# Patient Record
Sex: FEMALE | NOT HISPANIC OR LATINO | URBAN - METROPOLITAN AREA
[De-identification: names, ages, dates, MRNs, and addresses within clinical notes are randomized per-mention and may not be internally consistent; named-entity substitution may affect disease eponyms.]

---

## 2021-03-01 ENCOUNTER — APPOINTMENT (RX ONLY)
Dept: URBAN - METROPOLITAN AREA CLINIC 31 | Facility: CLINIC | Age: 66
Setting detail: DERMATOLOGY
End: 2021-03-01

## 2021-03-01 DIAGNOSIS — L29.89 OTHER PRURITUS: ICD-10-CM

## 2021-03-01 DIAGNOSIS — D22 MELANOCYTIC NEVI: ICD-10-CM

## 2021-03-01 DIAGNOSIS — L81.4 OTHER MELANIN HYPERPIGMENTATION: ICD-10-CM

## 2021-03-01 DIAGNOSIS — L29.8 OTHER PRURITUS: ICD-10-CM

## 2021-03-01 DIAGNOSIS — L60.3 NAIL DYSTROPHY: ICD-10-CM

## 2021-03-01 DIAGNOSIS — L82.1 OTHER SEBORRHEIC KERATOSIS: ICD-10-CM

## 2021-03-01 DIAGNOSIS — L28.1 PRURIGO NODULARIS: ICD-10-CM

## 2021-03-01 PROBLEM — D22.5 MELANOCYTIC NEVI OF TRUNK: Status: ACTIVE | Noted: 2021-03-01

## 2021-03-01 PROCEDURE — ? PHOTO-DOCUMENTATION

## 2021-03-01 PROCEDURE — ? PRESCRIPTION

## 2021-03-01 PROCEDURE — ? DIAGNOSIS COMMENT

## 2021-03-01 PROCEDURE — 99203 OFFICE O/P NEW LOW 30 MIN: CPT

## 2021-03-01 PROCEDURE — ? COUNSELING

## 2021-03-01 PROCEDURE — ? ADDITIONAL NOTES

## 2021-03-01 RX ORDER — BETAMETHASONE DIPROPIONATE 0.5 MG/G
1 CREAM TOPICAL BID
Qty: 1 | Refills: 2 | Status: ERX | COMMUNITY
Start: 2021-03-01

## 2021-03-01 RX ADMIN — BETAMETHASONE DIPROPIONATE 1: 0.5 CREAM TOPICAL at 00:00

## 2021-03-01 ASSESSMENT — LOCATION SIMPLE DESCRIPTION DERM
LOCATION SIMPLE: RIGHT HAND
LOCATION SIMPLE: LEFT HAND
LOCATION SIMPLE: RIGHT UPPER BACK
LOCATION SIMPLE: LEFT FOREARM
LOCATION SIMPLE: RIGHT BUTTOCK
LOCATION SIMPLE: LEFT BUTTOCK
LOCATION SIMPLE: RIGHT FOREARM
LOCATION SIMPLE: RIGHT CALF

## 2021-03-01 ASSESSMENT — LOCATION ZONE DERM
LOCATION ZONE: HAND
LOCATION ZONE: TRUNK
LOCATION ZONE: LEG
LOCATION ZONE: ARM

## 2021-03-01 ASSESSMENT — LOCATION DETAILED DESCRIPTION DERM
LOCATION DETAILED: RIGHT DISTAL CALF
LOCATION DETAILED: LEFT BUTTOCK
LOCATION DETAILED: RIGHT BUTTOCK
LOCATION DETAILED: LEFT ULNAR DORSAL HAND
LOCATION DETAILED: LEFT PROXIMAL DORSAL FOREARM
LOCATION DETAILED: RIGHT MID-UPPER BACK
LOCATION DETAILED: RIGHT PROXIMAL RADIAL DORSAL FOREARM
LOCATION DETAILED: RIGHT ULNAR DORSAL HAND
LOCATION DETAILED: RIGHT RADIAL DORSAL HAND

## 2021-03-01 NOTE — PROCEDURE: DIAGNOSIS COMMENT
Comment: See photos and body map, 03/01/2021
Detail Level: Detailed
Render Risk Assessment In Note?: no

## 2021-03-01 NOTE — PROCEDURE: COUNSELING
Detail Level: Simple
Detail Level: Generalized
Detail Level: Detailed
Patient Specific Counseling (Will Not Stick From Patient To Patient): Pt states present for 1 year, but had it also 20 years ago.  Discussed ddx of SCC.  F/u if does not respond to tx.  Declines ln2 today.
Detail Level: Zone

## 2021-03-01 NOTE — PROCEDURE: ADDITIONAL NOTES
Additional Notes: Discussed nail dystrophy occurs as we age. Encouraged to moisturize hands several times daily.
Detail Level: Detailed
Render Risk Assessment In Note?: no
Additional Notes: Patient may use betamethasone cream bid to affected areas that is being prescribed for purigo nodule.  Recommend one week daily use.  Also recommended OTC nonsedating antihistamines.

## 2021-08-10 PROBLEM — Z96.659 TOTAL KNEE REPLACEMENT STATUS: Status: ACTIVE | Noted: 2021-08-10

## 2021-10-13 ENCOUNTER — HOSPITAL ENCOUNTER (OUTPATIENT)
Facility: MEDICAL CENTER | Age: 66
End: 2021-10-13
Attending: ANESTHESIOLOGY
Payer: MEDICARE

## 2021-10-13 LAB
COVID ORDER STATUS COVID19: NORMAL
SARS-COV-2 RNA RESP QL NAA+PROBE: NOTDETECTED
SPECIMEN SOURCE: NORMAL

## 2021-10-13 PROCEDURE — U0003 INFECTIOUS AGENT DETECTION BY NUCLEIC ACID (DNA OR RNA); SEVERE ACUTE RESPIRATORY SYNDROME CORONAVIRUS 2 (SARS-COV-2) (CORONAVIRUS DISEASE [COVID-19]), AMPLIFIED PROBE TECHNIQUE, MAKING USE OF HIGH THROUGHPUT TECHNOLOGIES AS DESCRIBED BY CMS-2020-01-R: HCPCS

## 2021-10-13 PROCEDURE — U0005 INFEC AGEN DETEC AMPLI PROBE: HCPCS

## 2022-02-28 ENCOUNTER — APPOINTMENT (RX ONLY)
Dept: URBAN - METROPOLITAN AREA CLINIC 31 | Facility: CLINIC | Age: 67
Setting detail: DERMATOLOGY
End: 2022-02-28

## 2022-02-28 DIAGNOSIS — L81.4 OTHER MELANIN HYPERPIGMENTATION: ICD-10-CM

## 2022-02-28 DIAGNOSIS — L20.89 OTHER ATOPIC DERMATITIS: ICD-10-CM

## 2022-02-28 DIAGNOSIS — L82.1 OTHER SEBORRHEIC KERATOSIS: ICD-10-CM

## 2022-02-28 DIAGNOSIS — D22 MELANOCYTIC NEVI: ICD-10-CM | Status: STABLE

## 2022-02-28 PROBLEM — L20.84 INTRINSIC (ALLERGIC) ECZEMA: Status: ACTIVE | Noted: 2022-02-28

## 2022-02-28 PROBLEM — D22.5 MELANOCYTIC NEVI OF TRUNK: Status: ACTIVE | Noted: 2022-02-28

## 2022-02-28 PROCEDURE — ? COUNSELING

## 2022-02-28 PROCEDURE — ? DIAGNOSIS COMMENT

## 2022-02-28 PROCEDURE — 99213 OFFICE O/P EST LOW 20 MIN: CPT

## 2022-02-28 PROCEDURE — ? OBSERVATION AND MEASURE

## 2022-02-28 ASSESSMENT — LOCATION SIMPLE DESCRIPTION DERM
LOCATION SIMPLE: ABDOMEN
LOCATION SIMPLE: CHEST
LOCATION SIMPLE: RIGHT HAND
LOCATION SIMPLE: RIGHT FOREARM
LOCATION SIMPLE: LEFT BUTTOCK
LOCATION SIMPLE: RIGHT UPPER BACK
LOCATION SIMPLE: GROIN
LOCATION SIMPLE: RIGHT BUTTOCK
LOCATION SIMPLE: RIGHT BREAST

## 2022-02-28 ASSESSMENT — LOCATION DETAILED DESCRIPTION DERM
LOCATION DETAILED: RIGHT MID-UPPER BACK
LOCATION DETAILED: RIGHT VENTRAL PROXIMAL FOREARM
LOCATION DETAILED: RIGHT BUTTOCK
LOCATION DETAILED: RIGHT SUPRAPUBIC SKIN
LOCATION DETAILED: RIGHT INFRAMAMMARY CREASE (OUTER QUADRANT)
LOCATION DETAILED: RIGHT RADIAL DORSAL HAND
LOCATION DETAILED: LEFT BUTTOCK
LOCATION DETAILED: LEFT RIB CAGE
LOCATION DETAILED: LEFT MEDIAL SUPERIOR CHEST

## 2022-02-28 ASSESSMENT — LOCATION ZONE DERM
LOCATION ZONE: HAND
LOCATION ZONE: ARM
LOCATION ZONE: TRUNK

## 2022-02-28 NOTE — PROCEDURE: OBSERVATION
Detail Level: Detailed
Size Of Lesion: 4mm
Morphology Per Location (Optional): Dark macule
Morphology Per Location (Optional): Brown macule
Size Of Lesion: 5mm
Morphology Per Location (Optional): Slightly irregular macule

## 2022-11-28 ENCOUNTER — RX ONLY (OUTPATIENT)
Age: 67
Setting detail: RX ONLY
End: 2022-11-28

## 2022-11-28 RX ORDER — BETAMETHASONE DIPROPIONATE 0.5 MG/G
CREAM TOPICAL
Qty: 45 | Refills: 0 | Status: ERX | COMMUNITY
Start: 2022-11-28

## 2022-12-19 ENCOUNTER — OFFICE VISIT (OUTPATIENT)
Dept: URGENT CARE | Facility: CLINIC | Age: 67
End: 2022-12-19
Payer: MEDICARE

## 2022-12-19 VITALS
TEMPERATURE: 98.4 F | SYSTOLIC BLOOD PRESSURE: 130 MMHG | DIASTOLIC BLOOD PRESSURE: 68 MMHG | HEIGHT: 67 IN | HEART RATE: 90 BPM | RESPIRATION RATE: 12 BRPM | OXYGEN SATURATION: 95 % | WEIGHT: 181 LBS | BODY MASS INDEX: 28.41 KG/M2

## 2022-12-19 DIAGNOSIS — L03.011 CELLULITIS OF RIGHT INDEX FINGER: ICD-10-CM

## 2022-12-19 DIAGNOSIS — L08.9 INFECTED DOG BITE OF RIGHT INDEX FINGER, INITIAL ENCOUNTER: ICD-10-CM
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
## 2022-12-19 DIAGNOSIS — S61.250A INFECTED DOG BITE OF RIGHT INDEX FINGER, INITIAL ENCOUNTER: ICD-10-CM

## 2022-12-19 PROCEDURE — 99203 OFFICE O/P NEW LOW 30 MIN: CPT | Mod: 25 | Performed by: PHYSICIAN ASSISTANT

## 2022-12-19 PROCEDURE — 90715 TDAP VACCINE 7 YRS/> IM: CPT | Performed by: PHYSICIAN ASSISTANT

## 2022-12-19 PROCEDURE — 90471 IMMUNIZATION ADMIN: CPT | Performed by: PHYSICIAN ASSISTANT

## 2022-12-19 RX ORDER — AMOXICILLIN AND CLAVULANATE POTASSIUM 875; 125 MG/1; MG/1
1 TABLET, FILM COATED ORAL 2 TIMES DAILY
Qty: 14 TABLET | Refills: 0 | Status: SHIPPED | OUTPATIENT
Start: 2022-12-19 | End: 2022-12-26

## 2022-12-19 RX ORDER — BETAMETHASONE DIPROPIONATE 0.5 MG/G
CREAM TOPICAL
COMMUNITY
Start: 2022-11-28 | End: 2024-02-26 | Stop reason: SDUPTHER

## 2022-12-19 RX ORDER — FENOFIBRATE 200 MG/1
200 CAPSULE ORAL
COMMUNITY
Start: 2022-11-22

## 2022-12-19 ASSESSMENT — ENCOUNTER SYMPTOMS
TINGLING: 0
FEVER: 0
VOMITING: 0
SENSORY CHANGE: 0
NAUSEA: 0
CHILLS: 0

## 2022-12-19 NOTE — PROGRESS NOTES
Subjective     Sabina Rivas is a 67 y.o. female who presents with Dog Bite ((R) index finger)    HPI:  Cheryl Rivas is a 67 y.o. female who presents today for evaluation of a dog bite to her right index finger.  2 days ago patient tried to take food away from her dog and the dog nipped at her finger.  It bit the tip of her right index finger and jovanni blood.  She says that it was bleeding a lot the initial day.  Yesterday it started to get a bit more swollen and was draining some clear fluid.  Today swelling and redness is noted throughout the finger.  She is not had any fever/chills or numbness/tingling.  No decreased range of motion.      Review of Systems   Constitutional:  Negative for chills and fever.   Gastrointestinal:  Negative for nausea and vomiting.   Skin:         Dog bite of right index finger   Neurological:  Negative for tingling and sensory change.           PMH:  has a past medical history of Asthma.  MEDS:   Current Outpatient Medications:     citalopram (CELEXA) 10 MG tablet, , Disp: , Rfl:     albuterol 108 (90 Base) MCG/ACT Aero Soln inhalation aerosol, , Disp: , Rfl:     fluticasone (FLONASE) 50 MCG/ACT nasal spray, Spray 1 Spray in nose 2 times a day., Disp: , Rfl:     progesterone (PROMETRIUM) 100 MG Cap, Take 100 mg by mouth every day., Disp: , Rfl:     levothyroxine (SYNTHROID) 50 MCG Tab, Take 50 mcg by mouth Every morning on an empty stomach., Disp: , Rfl:     fenofibrate (TRICOR) 145 MG Tab, Take 145 mg by mouth every day., Disp: , Rfl:     Omega-3 Fatty Acids (OMEGA-3 FISH OIL) 1200 MG Cap, Take 2 tablet by mouth 2 Times a Day., Disp: , Rfl:     betamethasone dipropionate 0.05 % Cream, APPLY TO AFFECTED AREAS TWICE DAILY AS NEEDED FOR RASH/ITCH. AVOID FACE, GROIN, UNDERARMS., Disp: , Rfl:     fenofibrate micronized (LOFIBRA) 200 MG capsule, Take 200 mg by mouth every day., Disp: , Rfl:   ALLERGIES:   Allergies   Allergen Reactions    Oxycodone Rash and Vomiting     Rash all over/vomiting   "    SURGHX:   Past Surgical History:   Procedure Laterality Date    PB TOTAL KNEE ARTHROPLASTY Right 10/18/2021    Procedure: RIGHT TOTAL KNEE ARTHROPLASTY;  Surgeon: Joel Salinas M.D.;  Location: [facility];  Service: Orthopedics    KNEE ARTHROSCOPY Right 1/3/2020    Procedure: RT KNEE ARTHROSCOPY POSSIBLE ARTHROTOMY PARTIAL MEDIAL MENISCECTMY AND CHONDROPLASTY;  Surgeon: Marques Bhakta M.D.;  Location: SURGERY [facility];  Service: Orthopedics    OTHER      face lift    OTHER ABDOMINAL SURGERY      appendectomy     SOCHX:  reports that she has never smoked. She has never used smokeless tobacco. She reports current alcohol use of about 4.2 oz per week. She reports that she does not use drugs.  FH: Family history was reviewed, no pertinent findings to report      Objective     /68 (BP Location: Left arm, Patient Position: Sitting, BP Cuff Size: Adult)   Pulse 90   Temp 36.9 °C (98.4 °F) (Temporal)   Resp 12   Ht 1.702 m (5' 7\")   Wt 82.1 kg (181 lb)   SpO2 95%   BMI 28.35 kg/m²      Physical Exam  Constitutional:       General: She is not in acute distress.     Appearance: She is not diaphoretic.   HENT:      Head: Normocephalic and atraumatic.      Right Ear: External ear normal.      Left Ear: External ear normal.   Eyes:      Conjunctiva/sclera: Conjunctivae normal.      Pupils: Pupils are equal, round, and reactive to light.   Pulmonary:      Effort: Pulmonary effort is normal. No respiratory distress.   Musculoskeletal:      Cervical back: Normal range of motion.      Comments: Right hand: Pad of right second digit exhibits a scabbed over puncture wound/laceration approximately 0.25 cm in diameter.  There is no current discharge.  Entire second digit exhibits overlying erythema and mild soft tissue swelling.  Tenderness noted around the DIP joint and distal pad.  Full ROM with flexion and extension.  Distal neurovascular status intact.  Cap refill less than 2 " seconds.   Skin:     Findings: No rash.   Neurological:      Mental Status: She is alert and oriented to person, place, and time.   Psychiatric:         Mood and Affect: Mood and affect normal.         Cognition and Memory: Memory normal.         Judgment: Judgment normal.           Assessment & Plan     1. Infected dog bite of right index finger, initial encounter  - Tdap Vaccine =>6YO IM  - amoxicillin-clavulanate (AUGMENTIN) 875-125 MG Tab; Take 1 Tablet by mouth 2 times a day for 7 days.  Dispense: 14 Tablet; Refill: 0    2. Cellulitis of right index finger  - amoxicillin-clavulanate (AUGMENTIN) 875-125 MG Tab; Take 1 Tablet by mouth 2 times a day for 7 days.  Dispense: 14 Tablet; Refill: 0      Patient with infected dog bite.  We will start her on Augmentin.  Tdap also updated.  She may use OTC analgesics as needed for pain.  Should keep the wound open to the air unless it is draining or she is doing any dirty activities and she should keep it covered with a loosefitting dressing.  If patient has worsening redness or swelling or she develops fever I would recommend that she go to the emergency department for higher level of care.            Differential Diagnosis, natural history, and supportive care discussed. Return to the Urgent Care or follow up with your PCP if symptoms fail to resolve, or for any new or worsening symptoms. Emergency room precautions discussed. Patient and/or family appears understanding of information.

## 2023-01-27 ENCOUNTER — OFFICE VISIT (OUTPATIENT)
Dept: URGENT CARE | Facility: CLINIC | Age: 68
End: 2023-01-27
Payer: MEDICARE

## 2023-01-27 VITALS
OXYGEN SATURATION: 96 % | WEIGHT: 181 LBS | HEIGHT: 67 IN | DIASTOLIC BLOOD PRESSURE: 84 MMHG | RESPIRATION RATE: 14 BRPM | TEMPERATURE: 97.7 F | SYSTOLIC BLOOD PRESSURE: 134 MMHG | HEART RATE: 82 BPM | BODY MASS INDEX: 28.41 KG/M2

## 2023-01-27 DIAGNOSIS — S61.451A DOG BITE OF RIGHT HAND, INITIAL ENCOUNTER: ICD-10-CM
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
## 2023-01-27 PROCEDURE — 99213 OFFICE O/P EST LOW 20 MIN: CPT | Performed by: PHYSICIAN ASSISTANT

## 2023-01-27 RX ORDER — AMOXICILLIN AND CLAVULANATE POTASSIUM 875; 125 MG/1; MG/1
1 TABLET, FILM COATED ORAL 2 TIMES DAILY
Qty: 14 TABLET | Refills: 0 | Status: SHIPPED | OUTPATIENT
Start: 2023-01-27 | End: 2023-02-03

## 2023-01-27 ASSESSMENT — ENCOUNTER SYMPTOMS
FEVER: 0
JOINT SWELLING: 1

## 2023-01-27 NOTE — PROGRESS NOTES
Gilbert Rivas is a 67 y.o. female who presents with Animal Bite (Dog bite on R hand, swelling x 1 hour ago )            This is a new problem.  The patient presents to clinic secondary to a dog bite to the right hand.  The patient states the injury occurred approximately 1 hour prior to arrival.  The patient states her dog got into a fight with another dog.  The patient states that she was attempting to break up the dog bite her own dog bit her right hand.  The patient states she sustained 2 puncture wounds to the back of her right hand.  The patient reports associated pain, swelling, and bruising surrounding the injured area.  The patient notes some active bleeding from the puncture wounds.  She reports no discharge/drainage.  She also reports no associated redness or increased warmth.  The patient denies decreased range of motion of the right hand.  She also denies numbness, tingling, or weakness.  The patient has not taken any OTC medications for her current symptoms.  The patient states her tetanus vaccine is up-to-date.  The patient states her dog is also up-to-date on his vaccines.    Animal Bite  Associated symptoms include joint swelling. Pertinent negatives include no fever.     PMH:  has a past medical history of Asthma.  MEDS:   Current Outpatient Medications:     fenofibrate micronized (LOFIBRA) 200 MG capsule, Take 200 mg by mouth every day., Disp: , Rfl:     citalopram (CELEXA) 10 MG tablet, , Disp: , Rfl:     albuterol 108 (90 Base) MCG/ACT Aero Soln inhalation aerosol, , Disp: , Rfl:     fluticasone (FLONASE) 50 MCG/ACT nasal spray, Spray 1 Spray in nose 2 times a day., Disp: , Rfl:     progesterone (PROMETRIUM) 100 MG Cap, Take 100 mg by mouth every day., Disp: , Rfl:     levothyroxine (SYNTHROID) 50 MCG Tab, Take 50 mcg by mouth Every morning on an empty stomach., Disp: , Rfl:     fenofibrate (TRICOR) 145 MG Tab, Take 145 mg by mouth every day., Disp: , Rfl:     Omega-3 Fatty Acids  "(OMEGA-3 FISH OIL) 1200 MG Cap, Take 2 tablet by mouth 2 Times a Day., Disp: , Rfl:     betamethasone dipropionate 0.05 % Cream, APPLY TO AFFECTED AREAS TWICE DAILY AS NEEDED FOR RASH/ITCH. AVOID FACE, GROIN, UNDERARMS. (Patient not taking: Reported on 1/27/2023), Disp: , Rfl:   ALLERGIES:   Allergies   Allergen Reactions    Oxycodone Rash and Vomiting     Rash all over/vomiting      SURGHX:   Past Surgical History:   Procedure Laterality Date    PB TOTAL KNEE ARTHROPLASTY Right 10/18/2021    Procedure: RIGHT TOTAL KNEE ARTHROPLASTY;  Surgeon: Joel Salinas M.D.;  Location: [facility] [place];  Service: Orthopedics    KNEE ARTHROSCOPY Right 1/3/2020    Procedure: RT KNEE ARTHROSCOPY POSSIBLE ARTHROTOMY PARTIAL MEDIAL MENISCECTMY AND CHONDROPLASTY;  Surgeon: Marques Bhakta M.D.;  Location: [facility];  Service: Orthopedics    OTHER      face lift    OTHER ABDOMINAL SURGERY      appendectomy     SOCHX:  reports that she has never smoked. She has never used smokeless tobacco. She reports current alcohol use of about 4.2 oz per week. She reports that she does not use drugs.  FH: Family history was reviewed, no pertinent findings to report      Review of Systems   Constitutional:  Negative for fever.   Musculoskeletal:  Positive for joint swelling. Negative for joint pain.   Skin:         + dog bite to right hand   All other systems reviewed and are negative.           Objective     /84 (BP Location: Right arm, Patient Position: Sitting, BP Cuff Size: Adult)   Pulse 82   Temp 36.5 °C (97.7 °F) (Temporal)   Resp 14   Ht 1.702 m (5' 7\")   Wt 82.1 kg (181 lb)   SpO2 96%   BMI 28.35 kg/m²      Physical Exam  Constitutional:       General: She is not in acute distress.     Appearance: Normal appearance. She is well-developed. She is not ill-appearing.   HENT:      Head: Normocephalic and atraumatic.      Right Ear: External ear normal.      Left Ear: External ear normal.   Eyes: "      Extraocular Movements: Extraocular movements intact.      Conjunctiva/sclera: Conjunctivae normal.   Cardiovascular:      Rate and Rhythm: Normal rate.   Pulmonary:      Effort: Pulmonary effort is normal.   Musculoskeletal:      Cervical back: Normal range of motion and neck supple.      Comments:   Right Hand:  2 puncture wounds are present to the dorsal aspect of the right hand overlying the second and third metacarpals with surrounding tenderness palpation, localized edema, and ecchymosis.  No surrounding erythema.  No increased warmth.  No active bleeding.  No active discharge/drainage.  No secondary signs of infection.  ROM intact -the patient demonstrates full active range of motion of the right hand  Neurovascular intact distally  Strength 5/5 -flexion/extension of the digits of the right hand against resistance   strength 5/5   Skin:     General: Skin is warm and dry.   Neurological:      Mental Status: She is alert and oriented to person, place, and time.                Progress:   Wound Care:  Cleansed the patient's wound with Hibiclens.  Irrigated the wound with saline.  Applied a nonstick to the wound with Coban.  Educated the patient on proper wound care.  Advised the patient to monitor for signs of infection.             Assessment & Plan            1. Dog bite of right hand, initial encounter  - amoxicillin-clavulanate (AUGMENTIN) 875-125 MG Tab; Take 1 Tablet by mouth 2 times a day for 7 days.  Dispense: 14 Tablet; Refill: 0    The patient's presenting symptoms and physical exam endings are consistent with a dog bite of the right hand.  We will prophylactically treat the patient with Augmentin at this time.  Advised the patient to monitor for worsening signs and or symptoms.  Recommend OTC medications and supportive care for symptomatic management.  Recommend patient follow-up with her PCP as needed.  Discussed return precautions with the patient, and she verbalized  understanding.    Differential diagnoses, supportive care, and indications for immediate follow-up discussed with patient.   Instructed to return to clinic or nearest emergency department for any change in condition, further concerns, or worsening of symptoms.    OTC Tylenol or Motrin for fever/discomfort.  Keep wounds clean and dry  Cover wounds as needed  Allow wounds to be open to the air for at least a portion of the day  Avoid soaking the wounds  Monitor for signs of infection  Follow-up with PCP  Return to clinic or go to the ED if symptoms worsen or fail to improve, or if patient should develop worsening/increasing/persistent pain/tenderness to the injured area, swelling, bruising, redness or warmth to the injured area, discharge/drainage from the wound, decreased range of motion, fever/chills, secondary signs of infection, and/or any concerning symptoms.    Discussed plan with the patient, and she agrees to the above.     I personally reviewed prior external notes and test results pertinent to today's visit.  I have independently reviewed and interpreted all diagnostics ordered during this urgent care visit.     Please note that this dictation was created using voice recognition software. I have made every reasonable attempt to correct obvious errors, but I expect that there may be errors of grammar and possibly content that I did not discover before finalizing the note.     This note was electronically signed by Lorena Vazquez PA-C

## 2023-02-24 SDOH — ECONOMIC STABILITY: TRANSPORTATION INSECURITY
IN THE PAST 12 MONTHS, HAS LACK OF TRANSPORTATION KEPT YOU FROM MEETINGS, WORK, OR FROM GETTING THINGS NEEDED FOR DAILY LIVING?: NO

## 2023-02-24 SDOH — ECONOMIC STABILITY: INCOME INSECURITY: HOW HARD IS IT FOR YOU TO PAY FOR THE VERY BASICS LIKE FOOD, HOUSING, MEDICAL CARE, AND HEATING?: NOT VERY HARD

## 2023-02-24 SDOH — ECONOMIC STABILITY: TRANSPORTATION INSECURITY
IN THE PAST 12 MONTHS, HAS THE LACK OF TRANSPORTATION KEPT YOU FROM MEDICAL APPOINTMENTS OR FROM GETTING MEDICATIONS?: NO

## 2023-02-24 SDOH — HEALTH STABILITY: PHYSICAL HEALTH: ON AVERAGE, HOW MANY MINUTES DO YOU ENGAGE IN EXERCISE AT THIS LEVEL?: 30 MIN

## 2023-02-24 SDOH — ECONOMIC STABILITY: HOUSING INSECURITY
IN THE LAST 12 MONTHS, WAS THERE A TIME WHEN YOU DID NOT HAVE A STEADY PLACE TO SLEEP OR SLEPT IN A SHELTER (INCLUDING NOW)?: NO

## 2023-02-24 SDOH — ECONOMIC STABILITY: HOUSING INSECURITY

## 2023-02-24 SDOH — ECONOMIC STABILITY: TRANSPORTATION INSECURITY
IN THE PAST 12 MONTHS, HAS LACK OF RELIABLE TRANSPORTATION KEPT YOU FROM MEDICAL APPOINTMENTS, MEETINGS, WORK OR FROM GETTING THINGS NEEDED FOR DAILY LIVING?: NO

## 2023-02-24 SDOH — ECONOMIC STABILITY: FOOD INSECURITY: WITHIN THE PAST 12 MONTHS, YOU WORRIED THAT YOUR FOOD WOULD RUN OUT BEFORE YOU GOT MONEY TO BUY MORE.: NEVER TRUE

## 2023-02-24 SDOH — HEALTH STABILITY: MENTAL HEALTH
STRESS IS WHEN SOMEONE FEELS TENSE, NERVOUS, ANXIOUS, OR CAN'T SLEEP AT NIGHT BECAUSE THEIR MIND IS TROUBLED. HOW STRESSED ARE YOU?: NOT AT ALL

## 2023-02-24 SDOH — ECONOMIC STABILITY: INCOME INSECURITY: IN THE LAST 12 MONTHS, WAS THERE A TIME WHEN YOU WERE NOT ABLE TO PAY THE MORTGAGE OR RENT ON TIME?: NO

## 2023-02-24 SDOH — ECONOMIC STABILITY: FOOD INSECURITY: WITHIN THE PAST 12 MONTHS, THE FOOD YOU BOUGHT JUST DIDN'T LAST AND YOU DIDN'T HAVE MONEY TO GET MORE.: NEVER TRUE

## 2023-02-24 SDOH — HEALTH STABILITY: PHYSICAL HEALTH: ON AVERAGE, HOW MANY DAYS PER WEEK DO YOU ENGAGE IN MODERATE TO STRENUOUS EXERCISE (LIKE A BRISK WALK)?: 5 DAYS

## 2023-02-24 ASSESSMENT — SOCIAL DETERMINANTS OF HEALTH (SDOH)
DO YOU BELONG TO ANY CLUBS OR ORGANIZATIONS SUCH AS CHURCH GROUPS UNIONS, FRATERNAL OR ATHLETIC GROUPS, OR SCHOOL GROUPS?: YES
WITHIN THE PAST 12 MONTHS, YOU WORRIED THAT YOUR FOOD WOULD RUN OUT BEFORE YOU GOT THE MONEY TO BUY MORE: NEVER TRUE
IN A TYPICAL WEEK, HOW MANY TIMES DO YOU TALK ON THE PHONE WITH FAMILY, FRIENDS, OR NEIGHBORS?: MORE THAN THREE TIMES A WEEK
HOW HARD IS IT FOR YOU TO PAY FOR THE VERY BASICS LIKE FOOD, HOUSING, MEDICAL CARE, AND HEATING?: NOT VERY HARD
HOW OFTEN DO YOU HAVE A DRINK CONTAINING ALCOHOL: 2-3 TIMES A WEEK
HOW OFTEN DO YOU ATTEND CHURCH OR RELIGIOUS SERVICES?: NEVER
HOW OFTEN DO YOU ATTENT MEETINGS OF THE CLUB OR ORGANIZATION YOU BELONG TO?: MORE THAN 4 TIMES PER YEAR
HOW OFTEN DO YOU GET TOGETHER WITH FRIENDS OR RELATIVES?: ONCE A WEEK
HOW MANY DRINKS CONTAINING ALCOHOL DO YOU HAVE ON A TYPICAL DAY WHEN YOU ARE DRINKING: 1 OR 2
HOW OFTEN DO YOU GET TOGETHER WITH FRIENDS OR RELATIVES?: ONCE A WEEK
HOW OFTEN DO YOU HAVE SIX OR MORE DRINKS ON ONE OCCASION: NEVER
HOW OFTEN DO YOU ATTENT MEETINGS OF THE CLUB OR ORGANIZATION YOU BELONG TO?: MORE THAN 4 TIMES PER YEAR
IN A TYPICAL WEEK, HOW MANY TIMES DO YOU TALK ON THE PHONE WITH FAMILY, FRIENDS, OR NEIGHBORS?: MORE THAN THREE TIMES A WEEK
DO YOU BELONG TO ANY CLUBS OR ORGANIZATIONS SUCH AS CHURCH GROUPS UNIONS, FRATERNAL OR ATHLETIC GROUPS, OR SCHOOL GROUPS?: YES
HOW OFTEN DO YOU ATTEND CHURCH OR RELIGIOUS SERVICES?: NEVER

## 2023-02-24 ASSESSMENT — LIFESTYLE VARIABLES
SKIP TO QUESTIONS 9-10: 1
AUDIT-C TOTAL SCORE: 3
HOW OFTEN DO YOU HAVE SIX OR MORE DRINKS ON ONE OCCASION: NEVER
HOW MANY STANDARD DRINKS CONTAINING ALCOHOL DO YOU HAVE ON A TYPICAL DAY: 1 OR 2
HOW OFTEN DO YOU HAVE A DRINK CONTAINING ALCOHOL: 2-3 TIMES A WEEK

## 2023-02-27 ENCOUNTER — OFFICE VISIT (OUTPATIENT)
Dept: MEDICAL GROUP | Facility: PHYSICIAN GROUP | Age: 68
End: 2023-02-27
Payer: MEDICARE

## 2023-02-27 VITALS
WEIGHT: 176 LBS | HEART RATE: 87 BPM | SYSTOLIC BLOOD PRESSURE: 126 MMHG | BODY MASS INDEX: 27.62 KG/M2 | RESPIRATION RATE: 12 BRPM | TEMPERATURE: 99.1 F | OXYGEN SATURATION: 95 % | HEIGHT: 67 IN | DIASTOLIC BLOOD PRESSURE: 82 MMHG

## 2023-02-27 DIAGNOSIS — Z82.49 FAMILY HISTORY OF HEART DISEASE: ICD-10-CM

## 2023-02-27 DIAGNOSIS — Z78.0 POST-MENOPAUSAL: ICD-10-CM

## 2023-02-27 DIAGNOSIS — Z23 NEED FOR VACCINATION: ICD-10-CM

## 2023-02-27 DIAGNOSIS — Z12.12 SCREENING FOR COLORECTAL CANCER: ICD-10-CM

## 2023-02-27 DIAGNOSIS — Z12.11 SCREENING FOR COLORECTAL CANCER: ICD-10-CM

## 2023-02-27 PROCEDURE — 99213 OFFICE O/P EST LOW 20 MIN: CPT | Mod: 25 | Performed by: FAMILY MEDICINE

## 2023-02-27 PROCEDURE — 90662 IIV NO PRSV INCREASED AG IM: CPT | Performed by: FAMILY MEDICINE

## 2023-02-27 PROCEDURE — G0008 ADMIN INFLUENZA VIRUS VAC: HCPCS | Performed by: FAMILY MEDICINE

## 2023-02-27 RX ORDER — PROGESTERONE 100 MG/1
100 CAPSULE ORAL
COMMUNITY
Start: 2023-02-01

## 2023-02-27 ASSESSMENT — ENCOUNTER SYMPTOMS
HEARTBURN: 0
NEUROLOGICAL NEGATIVE: 1
MYALGIAS: 0
CHILLS: 0
PSYCHIATRIC NEGATIVE: 1
CARDIOVASCULAR NEGATIVE: 1
FEVER: 0
DOUBLE VISION: 0
EYES NEGATIVE: 1
DIZZINESS: 0
DEPRESSION: 0
BRUISES/BLEEDS EASILY: 0
HEADACHES: 0
CONSTITUTIONAL NEGATIVE: 1
GASTROINTESTINAL NEGATIVE: 1
TINGLING: 0
MUSCULOSKELETAL NEGATIVE: 1
NAUSEA: 0
RESPIRATORY NEGATIVE: 1
BLURRED VISION: 0
HEMOPTYSIS: 0
COUGH: 0
PALPITATIONS: 0

## 2023-02-27 ASSESSMENT — PATIENT HEALTH QUESTIONNAIRE - PHQ9: CLINICAL INTERPRETATION OF PHQ2 SCORE: 0

## 2023-02-27 NOTE — PROGRESS NOTES
Gilbert Rivas is a 67 y.o. female who presents with Establish Care (Obgyn referral for HRT therapy/cardiology referral to Dr. Harper )            New patient visit, moved here from [place]  Takes bio-identical hormones and wants gyn who will rx them along with low dose thyroid to help with her symptoms. States she had labs in past 6 months for this  Also with father and brother with early cardiac death and wants to see cardiology for f/u  HM also done    1. Need for vaccination     Influenza Vaccine, High Dose (65+ Only)    2. Post-menopausal     Referral to Gynecology   DS-BONE DENSITY STUDY (DEXA)    3. Family history of heart disease     REFERRAL TO CARDIOLOGY    4. Screening for colorectal cancer   Referral to GI for Colonoscopy    Past Medical History:  No date: Asthma  No date: Thyroid disease  Past Surgical History:  10/18/2021: PB TOTAL KNEE ARTHROPLASTY; Right      Comment:  Procedure: RIGHT TOTAL KNEE ARTHROPLASTY;  Surgeon:                Joel Salinas M.D.;  Location: [facility]               [place];  Service: Orthopedics  01/03/2020: KNEE ARTHROSCOPY; Right      Comment:  Procedure: RT KNEE ARTHROSCOPY POSSIBLE ARTHROTOMY                PARTIAL MEDIAL MENISCECTMY AND CHONDROPLASTY;  Surgeon:                Marques Bhakta M.D.;  Location: SURGERY [facility];  Service: Orthopedics  No date: APPENDECTOMY  No date: ARTHROPLASTY  No date: LUMPECTOMY  No date: OTHER      Comment:  face lift  No date: OTHER ABDOMINAL SURGERY      Comment:  appendectomy  Social History    Tobacco Use      Smoking status: Never      Smokeless tobacco: Never    Vaping Use      Vaping Use: Never used    Alcohol use: Yes      Alcohol/week: 1.2 - 1.8 oz      Types: 2 - 3 Standard drinks or equivalent per week    Drug use: Never    Review of patient's family history indicates:      Current Outpatient Medications: ·  fenofibrate micronized (LOFIBRA) 200 MG capsule, Take 200 mg by  mouth every day., Disp: , Rfl: ·  citalopram (CELEXA) 10 MG tablet, , Disp: , Rfl: ·  albuterol 108 (90 Base) MCG/ACT Aero Soln inhalation aerosol, , Disp: , Rfl:  ·  fluticasone (FLONASE) 50 MCG/ACT nasal spray, Spray 1 Spray in nose 2 times a day., Disp: , Rfl: ·  progesterone (PROMETRIUM) 100 MG Cap, Take 100 mg by mouth every day., Disp: , Rfl: ·  levothyroxine (SYNTHROID) 50 MCG Tab, Take 50 mcg by mouth Every morning on an empty stomach., Disp: , Rfl: ·  progesterone (PROMETRIUM) 100 MG Cap, Take 100 mg by mouth at bedtime. (Patient not taking: Reported on 2/27/2023), Disp: , Rfl: ·  betamethasone dipropionate 0.05 % Cream, APPLY TO AFFECTED AREAS TWICE DAILY AS NEEDED FOR RASH/ITCH. AVOID FACE, GROIN, UNDERARMS. (Patient not taking: Reported on 1/27/2023), Disp: , Rfl: ·  fenofibrate (TRICOR) 145 MG Tab, Take 145 mg by mouth every day. (Patient not taking: Reported on 2/27/2023), Disp: , Rfl: ·  Omega-3 Fatty Acids (OMEGA-3 FISH OIL) 1200 MG Cap, Take 2 tablet by mouth 2 Times a Day. (Patient not taking: Reported on 2/27/2023), Disp: , Rfl:     Patient was instructed on the use of medications, either prescriptions or OTC and informed on when the appropriate follow up time period should be. In addition, patient was also instructed that should any acute worsening occur that they should notify this clinic asap or call 911.            Review of Systems   Constitutional: Negative.  Negative for chills and fever.   HENT: Negative.  Negative for hearing loss.    Eyes: Negative.  Negative for blurred vision and double vision.   Respiratory: Negative.  Negative for cough and hemoptysis.    Cardiovascular: Negative.  Negative for chest pain and palpitations.   Gastrointestinal: Negative.  Negative for heartburn and nausea.   Genitourinary: Negative.  Negative for dysuria.   Musculoskeletal: Negative.  Negative for myalgias.   Skin: Negative.  Negative for rash.   Neurological: Negative.  Negative for dizziness,  "tingling and headaches.   Endo/Heme/Allergies: Negative.  Does not bruise/bleed easily.   Psychiatric/Behavioral: Negative.  Negative for depression and suicidal ideas.    All other systems reviewed and are negative.           Objective     /82 (BP Location: Right arm, Patient Position: Sitting, BP Cuff Size: Adult)   Pulse 87   Temp 37.3 °C (99.1 °F) (Temporal)   Resp 12   Ht 1.702 m (5' 7\")   Wt 79.8 kg (176 lb)   SpO2 95%   BMI 27.57 kg/m²      Physical Exam  Vitals and nursing note reviewed.   Constitutional:       General: She is not in acute distress.     Appearance: She is well-developed. She is not diaphoretic.   HENT:      Head: Normocephalic and atraumatic.      Mouth/Throat:      Pharynx: No oropharyngeal exudate.   Eyes:      Pupils: Pupils are equal, round, and reactive to light.   Cardiovascular:      Rate and Rhythm: Normal rate and regular rhythm.      Heart sounds: Normal heart sounds. No murmur heard.    No friction rub. No gallop.   Pulmonary:      Effort: Pulmonary effort is normal. No respiratory distress.      Breath sounds: Normal breath sounds. No wheezing or rales.   Chest:      Chest wall: No tenderness.   Neurological:      Mental Status: She is alert and oriented to person, place, and time.   Psychiatric:         Behavior: Behavior normal.         Thought Content: Thought content normal.         Judgment: Judgment normal.                           Assessment & Plan        1. Need for vaccination    - Influenza Vaccine, High Dose (65+ Only)    2. Post-menopausal    - Referral to Gynecology  - DS-BONE DENSITY STUDY (DEXA)    3. Family history of heart disease    - REFERRAL TO CARDIOLOGY    4. Screening for colorectal cancer  - Referral to GI for Colonoscopy                  "

## 2023-02-27 NOTE — LETTER
[facility]  Vikram Phelan M.D.  [address]  [facility] [address]  Fax: [phone]   Authorization for Release/Disclosure of   Protected Health Information   Name: JR RIVAS : 1955 SSN: [id number]   Address: [address] [place] [id number] Phone:    [phone] (home)    I authorize the entity listed below to release/disclose the PHI below to:   [facility]/Vikram Phelan M.D. and Vikram Phelan M.D.   Provider or Entity Name:     Address   City, State, [facility]   Phone:      Fax:     Reason for request: continuity of care   Information to be released:    [  ] LAST COLONOSCOPY,  including any PATH REPORT and follow-up  [  ] LAST FIT/COLOGUARD RESULT [  ] LAST DEXA  [  ] LAST MAMMOGRAM  [  ] LAST PAP  [  ] LAST LABS [  ] RETINA EXAM REPORT  [  ] IMMUNIZATION RECORDS  [  ] Release all info      [  ] Check here and initial the line next to each item to release ALL health information INCLUDING  _____ Care and treatment for drug and / or alcohol abuse  _____ HIV testing, infection status, or AIDS  _____ Genetic Testing    DATES OF SERVICE OR TIME PERIOD TO BE DISCLOSED: _____________  I understand and acknowledge that:  * This Authorization may be revoked at any time by you in writing, except if your health information has already been used or disclosed.  * Your health information that will be used or disclosed as a result of you signing this authorization could be re-disclosed by the recipient. If this occurs, your re-disclosed health information may no longer be protected by State or Federal laws.  * You may refuse to sign this Authorization. Your refusal will not affect your ability to obtain treatment.  * This Authorization becomes effective upon signing and will  on (date) __________.      If no date is indicated, this Authorization will  one (1) year from the signature date.    Name: Jr Rivas  Signature: Date:   2023     PLEASE FAX REQUESTED  RECORDS BACK TO: [phone]

## 2023-03-06 ENCOUNTER — APPOINTMENT (RX ONLY)
Dept: URBAN - METROPOLITAN AREA CLINIC 31 | Facility: CLINIC | Age: 68
Setting detail: DERMATOLOGY
End: 2023-03-06

## 2023-03-06 ENCOUNTER — RX ONLY (OUTPATIENT)
Age: 68
Setting detail: RX ONLY
End: 2023-03-06

## 2023-03-06 DIAGNOSIS — L81.4 OTHER MELANIN HYPERPIGMENTATION: ICD-10-CM

## 2023-03-06 DIAGNOSIS — L20.89 OTHER ATOPIC DERMATITIS: ICD-10-CM

## 2023-03-06 DIAGNOSIS — L82.0 INFLAMED SEBORRHEIC KERATOSIS: ICD-10-CM

## 2023-03-06 DIAGNOSIS — L82.1 OTHER SEBORRHEIC KERATOSIS: ICD-10-CM

## 2023-03-06 DIAGNOSIS — D22 MELANOCYTIC NEVI: ICD-10-CM

## 2023-03-06 PROBLEM — D22.5 MELANOCYTIC NEVI OF TRUNK: Status: ACTIVE | Noted: 2023-03-06

## 2023-03-06 PROBLEM — L20.84 INTRINSIC (ALLERGIC) ECZEMA: Status: ACTIVE | Noted: 2023-03-06

## 2023-03-06 PROCEDURE — ? PATIENT SPECIFIC COUNSELING

## 2023-03-06 PROCEDURE — ? COUNSELING: TOPICAL STEROIDS

## 2023-03-06 PROCEDURE — ? OBSERVATION AND MEASURE

## 2023-03-06 PROCEDURE — 99213 OFFICE O/P EST LOW 20 MIN: CPT

## 2023-03-06 PROCEDURE — ? DIAGNOSIS COMMENT

## 2023-03-06 PROCEDURE — ? COUNSELING

## 2023-03-06 RX ORDER — BETAMETHASONE DIPROPIONATE 0.5 MG/G
CREAM TOPICAL
Qty: 45 | Refills: 3 | Status: ERX

## 2023-03-06 ASSESSMENT — LOCATION SIMPLE DESCRIPTION DERM
LOCATION SIMPLE: CHEST
LOCATION SIMPLE: LEFT BUTTOCK
LOCATION SIMPLE: RIGHT UPPER BACK
LOCATION SIMPLE: GROIN
LOCATION SIMPLE: RIGHT BREAST
LOCATION SIMPLE: ABDOMEN
LOCATION SIMPLE: RIGHT BUTTOCK
LOCATION SIMPLE: RIGHT FOREARM
LOCATION SIMPLE: RIGHT HAND

## 2023-03-06 ASSESSMENT — LOCATION DETAILED DESCRIPTION DERM
LOCATION DETAILED: RIGHT RADIAL DORSAL HAND
LOCATION DETAILED: RIGHT SUPRAPUBIC SKIN
LOCATION DETAILED: RIGHT INFRAMAMMARY CREASE (OUTER QUADRANT)
LOCATION DETAILED: LEFT RIB CAGE
LOCATION DETAILED: LEFT MEDIAL SUPERIOR CHEST
LOCATION DETAILED: RIGHT MID-UPPER BACK
LOCATION DETAILED: RIGHT VENTRAL PROXIMAL FOREARM
LOCATION DETAILED: RIGHT RIB CAGE
LOCATION DETAILED: LEFT BUTTOCK
LOCATION DETAILED: RIGHT BUTTOCK

## 2023-03-06 ASSESSMENT — LOCATION ZONE DERM
LOCATION ZONE: ARM
LOCATION ZONE: TRUNK
LOCATION ZONE: HAND

## 2023-03-06 NOTE — PROCEDURE: COUNSELING
Detail Level: Zone
Detail Level: Generalized
Patient Specific Counseling (Will Not Stick From Patient To Patient): Pt declines cryo; will apply betamethasone daily for a few days; f/u prn
Detail Level: Detailed

## 2023-03-07 ENCOUNTER — TELEPHONE (OUTPATIENT)
Dept: HEALTH INFORMATION MANAGEMENT | Facility: OTHER | Age: 68
End: 2023-03-07
Payer: MEDICARE

## 2023-03-07 ENCOUNTER — HOSPITAL ENCOUNTER (OUTPATIENT)
Dept: RADIOLOGY | Facility: MEDICAL CENTER | Age: 68
End: 2023-03-07
Attending: FAMILY MEDICINE
Payer: MEDICARE

## 2023-03-07 PROCEDURE — 77080 DXA BONE DENSITY AXIAL: CPT

## 2023-04-11 ENCOUNTER — TELEPHONE (OUTPATIENT)
Dept: MEDICAL GROUP | Facility: PHYSICIAN GROUP | Age: 68
End: 2023-04-11
Payer: MEDICARE

## 2023-04-11 NOTE — TELEPHONE ENCOUNTER
Pt has not heard back from OBGYN office. If pharmacy sends us refill for her HRT would you be able to fill it for her while she still tries to get in with OBGYN?

## 2023-06-07 ENCOUNTER — OFFICE VISIT (OUTPATIENT)
Dept: CARDIOLOGY | Facility: MEDICAL CENTER | Age: 68
End: 2023-06-07
Attending: FAMILY MEDICINE
Payer: MEDICARE

## 2023-06-07 ENCOUNTER — RESEARCH ENCOUNTER (OUTPATIENT)
Dept: CARDIOLOGY | Facility: MEDICAL CENTER | Age: 68
End: 2023-06-07
Attending: INTERNAL MEDICINE

## 2023-06-07 VITALS
SYSTOLIC BLOOD PRESSURE: 128 MMHG | DIASTOLIC BLOOD PRESSURE: 82 MMHG | HEIGHT: 67 IN | RESPIRATION RATE: 16 BRPM | OXYGEN SATURATION: 97 % | HEART RATE: 82 BPM | WEIGHT: 183 LBS | BODY MASS INDEX: 28.72 KG/M2

## 2023-06-07 DIAGNOSIS — R07.89 OTHER CHEST PAIN: ICD-10-CM

## 2023-06-07 DIAGNOSIS — Z00.6 RESEARCH STUDY PATIENT: ICD-10-CM

## 2023-06-07 DIAGNOSIS — Z13.6 ENCOUNTER FOR SCREENING FOR CARDIOVASCULAR DISORDERS: ICD-10-CM

## 2023-06-07 DIAGNOSIS — Z82.49 FAMILY HISTORY OF HEART DISEASE: ICD-10-CM

## 2023-06-07 LAB — EKG IMPRESSION: NORMAL

## 2023-06-07 PROCEDURE — 93010 ELECTROCARDIOGRAM REPORT: CPT | Performed by: INTERNAL MEDICINE

## 2023-06-07 PROCEDURE — 99212 OFFICE O/P EST SF 10 MIN: CPT | Performed by: INTERNAL MEDICINE

## 2023-06-07 PROCEDURE — 3074F SYST BP LT 130 MM HG: CPT | Performed by: INTERNAL MEDICINE

## 2023-06-07 PROCEDURE — 99204 OFFICE O/P NEW MOD 45 MIN: CPT | Performed by: INTERNAL MEDICINE

## 2023-06-07 PROCEDURE — 3079F DIAST BP 80-89 MM HG: CPT | Performed by: INTERNAL MEDICINE

## 2023-06-07 PROCEDURE — 93005 ELECTROCARDIOGRAM TRACING: CPT | Performed by: INTERNAL MEDICINE

## 2023-06-07 ASSESSMENT — ENCOUNTER SYMPTOMS
CHILLS: 0
FOCAL WEAKNESS: 0
BLURRED VISION: 0
BRUISES/BLEEDS EASILY: 0
WEAKNESS: 0
PALPITATIONS: 0
FALLS: 0
SORE THROAT: 0
PND: 0
NAUSEA: 0
FEVER: 0
CLAUDICATION: 0
DIZZINESS: 0
COUGH: 0
SHORTNESS OF BREATH: 0
ABDOMINAL PAIN: 0

## 2023-06-07 NOTE — PATIENT INSTRUCTIONS
A - Antiplatelet - Aspirin 81 mg daily or other antiplatelets (clopidogrel (PLAVIX), prasrugrel (EFFIENT), ticagrelor (BRILINTA)) reduce your risk.  B - Blood Pressure Control - reduces your risk or heart attack and stroke.  C - Cholesterol Management - statins reduce your risk; for those that are intolerant to statins, there are alternatives.  D - Diet - Cardiac rehab diets, [email or web address].  E - Exercise - at least 5 hours of moderate exercise weekly  (typical brisk walking or similar activity).  F - Fats - VASCEPA,  or Fish oil (if Vascepa too expensive) for elevated triglycerides (REDUCE IT trial showed reduction from 22% 5 year MACE to 17%).  G - Good Vibes - meditation, exercise, yoga, Pilates, mindfulness, Jesús-Chi, stress reduction.  H - Heart Failure - betablockers, sucubatril (ENTRESTO) 16% less risk of dying over 3 years, spironolactone, dapagliflozin (FARXIGA) 17% less risk of dying over 2 years, CRT +/- ICD.  I - Inflammation - Colchicine in the LoDoCo[employer] study in 2020 reduced the risk of heart attack by 30% in 2.5 year follow up.  R - Rehab - Cardiac rehab reduces risk of dying by 13-24% and need to go to the hospital by 30% within the first year.  S - Smoking - never smoke, if you do smoke ask for help to quit for good. Patients who quit smoking after heart attack have 36% less likely risk of dying.  Resources are 1-800-QUIT-NOW [employer] in addition to Chantix, Zyban or nicotine replacement  T - Type II Diabetes - pills empagliflozin (JARDIANCE) 38% less risk of dying over 4 years, and/or weekly injections liraglutide (Victoza), semaglutide (Ozempic), dulaglutide (Trulicity) ~26% less risk of MACE in 2 years.  V - Vaccines - Annual flu shot and COVID vaccine reduces the risk of serious cardiovascular complications from these deadly infections.  W - Weight - maintain a healthy weight.      Work on at least 1.5 - 5 hours a week of moderate exercise    Please look into the  following diets and incorporate them into your diet  LOW SALT DIET   KEEP YOUR SODIUM EQUAL TO CALORIES AND NO MORE THAN DOUBLE THE CALORIES FOR A LOW SALT DIET    Cardiosmart[facility]org - great resource for American College of Cardiology on heart disease prevention and treatment    FOR TREATMENT OR PREVENTION OF CORONARY ARTERY DISEASE  These three programs are approved by Medicare/Insurers for those with heart disease  [facility]  Dr. Brown[facility] for Reversing Heart Disease - Morgan Bello's Cardiologist vegetarian-based  [facility] - [place]-based mind-body Program    This is a commonly referenced Program  Dr Sandoval - [employer] [facility] [employer] (book and documentary) - vegetarian-based    FOR TREATMENT OF BLOOD PRESSURE  DASH DIET - American Heart Association for treatment of HYPERTENSION    FOR TREATMENT OF BAD CHOLESTEROL/FATS  REDUCE PROCESSED SUGAR AS MUCH AS POSSIBLE  INCREASE WHOLE GRAINS/VEGETABLES  INCREASE FIBER    Lowering total cholesterol and LDL (bad) cholesterol:  - Eat leaner cuts of meat, or eliminate altogether if possible red meat, and frequently substitute fish or chicken.  - Limit saturated fat to no more than 7-10% of total calories no more than 10 g per day is recommended. Some sources of saturated fat include butter, animal fats, hydrogenated vegetable fats and oils, many desserts, whole milk dairy products.  - Replaced saturated fats with polyunsaturated fats and monounsaturated fats. Foods high in monounsaturated fat include nuts, canola oil, avocados, and olives.  - Limit trans fat (processed foods) and replaced with fresh fruits and vegetables  - Recommend nonfat dairy products  - Increase substantially the amount of soluble fiber intake (legumes such as beans, fruit, whole grains).  - Consider nutritional supplements: plant sterile spreads such as Benecol, fish oil,  flaxseed oil, omega-3 acids capsules 1000 mg twice a day, or  "viscous fiber such as Metamucil  - Attain ideal weight and regular exercise (at least 30 minutes per day of moderate exercise)  ASK ABOUT STATIN OR NON STATIN MEDICATION TO REDUCE YOUR LDL AND HEART RISK    Lowering triglycerides:  - Reduce intake of simple sugar: Desserts, candy, pastries, honey, sodas, sugared cereals, yogurt, Gatorade, sports bars, canned fruit, smoothies, fruit juice, coffee drinks  - Reduced intake of refined starches: Refined Pasta, most bread  - Reduce or abstain from alcohol  - Increase omega-3 fatty acids: [place], Trout, Mackerel, Herring, [place] tuna and supplements  - Attain ideal weight and regular exercise (at least 30 minutes per day of moderate exercise)  ASK ABOUT PURIFIED OMEGA 3 EPA or FISH OIL TO REDUCE YOUR TG AND HEART RISK    Elevating HDL (good) cholesterol:  - Increase physical activity  - Increase omega-3 fatty acids and supplements as listed above  - Incorporating appropriate amounts of monounsaturated fats such as nuts, olive oil, canola oil, avocados, olives  - Stop smoking  - Attain ideal weight and regular exercise (at least 30 minutes per day of moderate exercise)      INTRODUCTION  Normal women have menopause at a mean age of 51 years, with 95 percent becoming menopausal between the ages of 45 to 55 years. Estrogen is the most effective treatment available for relief of menopausal symptoms, most importantly hot flashes. Menopausal hormone therapy (MHT; estrogen alone or combined with a progestin) is currently indicated for management of menopausal symptoms. Long-term use for prevention of disease is no longer recommended.    The impact of MHT on cardiovascular risk will be reviewed here. The discussion will include both cardiovascular outcomes and the effect of estrogen therapy on serum lipid values, blood pressure, and body weight. Other aspects of estrogen therapy are reviewed in detail elsewhere. (See \"Menopausal hormone therapy: Benefits and risks\" and " "\"Menopausal hormone therapy in the prevention and treatment of osteoporosis\" and \"Menopausal hormone therapy and the risk of breast cancer\" and \"Treatment of menopausal symptoms with hormone therapy\".)    CORONARY HEART DISEASE  Although initial observational studies suggested benefit from menopausal hormone therapy (MHT) for both primary and secondary prevention of coronary heart disease (CHD), this was not confirmed in subsequent large trials. Factors including the older age of the subjects in the Women[facility] ([facility]) population, as well as the estrogen type, route of administration, and dose may play a role [1].    Cardiovascular effects of estrogen -- A number of effects of estrogen could affect the risk of cardiovascular disease. However, it is not known how these effects influence the overall risk of CHD with MHT [2,3]. Examples of potential beneficial effects of estrogen include:    ?An improvement in lipid profiles, primarily with oral estrogens. (See 'Lipids' below.)    ?Enhanced endothelial function, which may occur in young healthy women, but not older postmenopausal women with coronary disease [4,5].    ?Improved insulin sensitivity, although data are inconsistent.    Examples of potential adverse effects include:    ?An increase in serum triglyceride concentrations with oral estrogens. (See 'Lipids' below.)    ?Prothrombotic effects including a reduction in serum fibrinogen, factor VII, and antithrombin III with oral estrogen (less affected by transdermal estrogen) [6].    ?An increase in hepatic synthesis of vascular inflammatory markers such as C-reactive protein (CRP) (primarily oral preparations) [7].    Effects of progestins -- Synthetic progestins, such as medroxyprogesterone acetate (the progestin used in the [facility]I trial and Heart and Estrogen/Progestin Replacement Study [HERS] described below), may negate some of the effects of estrogen on lipids and endothelial function [4,8]. Natural " progesterone has been less well studied but does not appear to negate the effects of estrogen on serum lipids. (See 'Lipids' below.)    Both unopposed conjugated estrogen and combined conjugated estrogen-progestin therapy increase CRP concentrations. In one study, the increase in CRP was positively or negatively correlated with interleukin-6 (IL-6) with combined estrogen-progestin therapy or unopposed estrogen, respectively [9]. This suggests a noninflammatory pathway for stimulation of CRP with unopposed estrogen, but an inflammatory one when progestin is added.    Primary prevention -- Beginning in the late 1980s, there was a shift from prescribing short-term estrogen therapy for menopausal women for symptoms to prescribing it long-term (more than five years) for prevention of disease, particularly CHD. This prevention strategy was based on over 30 observational studies, almost all of which demonstrated a striking protective effect of estrogen on the heart [10-12]. In addition to the observational data, angiographic and autopsy studies suggested an antiatherogenic effect of estrogen [13,14].    However, initial publications from the WHI trial suggested that MHT was not effective for primary prevention of CHD [15,16]. The WHI trial was a set of clinical trials, including two estrogen therapy trials, in healthy postmenopausal women aged 50 to 79 years.    One of the trials (combined continuous estrogen-progestin regimen [conjugated estrogen 0.625 mg and medroxyprogesterone acetate 2.5 mg/day] versus placebo in over 16,000 women) was discontinued three years early due to an increased risk of breast cancer, stroke, CHD, and venous thromboembolism (VTE) over an average follow-up of 5.2 years [15]. Although there were significant benefits (reduction in risk of fractures and colon cancer), there was concern that the risks of estrogen-progestin therapy outweighed the benefits in some women.    The [facility]I unopposed estrogen  "(conjugated estrogen 0.625 mg) versus placebo trial in nearly 11,000 women who had undergone hysterectomy (and therefore did not require a progestin) was also discontinued (one year early), due to an increased risk of stroke and a calculation that suggested no overall health benefit [16]. (See \"Menopausal hormone therapy: Benefits and risks\".)    Combined estrogen-progestin -- In the initial reports from the WHI, the hazard ratio (HR) for CHD (nonfatal myocardial infarction or death due to CHD) in the overall cohort taking combined estrogen-progestin trial was 1.24 (95% CI 1.0-1.5) [17]. However, subsequent analysis suggests that the increased risk may be confined to older women. (See 'Timing of exposure' below.)    Other factors, including body mass index (BMI), presence of vasomotor symptoms, additional coronary risk factors (including diabetes, hypertension, family history, and smoking), aspirin or statin use, and CRP were not significantly related to CHD risk with hormone therapy (HT). Biochemical predictors of risk and the possible effect of age and years from menopause are discussed below. (See 'Predictors of risk' below and 'Timing of exposure' below.)    There was a significant increase in stroke and VTE (figure 1 and figure 2). (See 'Stroke' below and 'Venous thromboembolism' below.)    The risk of breast cancer was slightly increased, but the absolute excess risk was very small. The risk of fracture and colon cancer was decreased, as discussed elsewhere. (See \"Menopausal hormone therapy and the risk of breast cancer\" and \"Menopausal hormone therapy in the prevention and treatment of osteoporosis\" and \"Menopausal hormone therapy: Benefits and risks\".)    The absolute risk of any adverse event (breast cancer, CHD, stroke, or VTE) occurring in an individual was extremely low (19 additional events per year per 10,000 women with MHT versus placebo).    A second, multicenter trial (Women's International Study of " "Long-Duration Oestrogen After Menopause [WISDOM]), similar in design and patient population to the WHI, enrolled [id number] women but was discontinued after the publication of the early results from the WHI [18]. However, after a median follow-up of 12 months, an excess risk of CHD events and VTE, but not stroke, was observed in the overall cohort receiving combined estrogen-progestin group compared with placebo. There were too few events to analyze risk based upon patient age or years from menopause. (See 'Timing of exposure' below.)    Unopposed estrogen -- Unlike the excess overall CHD risk observed in the combined estrogen-progestin trial, the WHI reported an HR for CHD of 0.95 (95% CI 0.70-1.16) in the unopposed estrogen trial [16]. There was a suggestion of a protective effect in the younger women (ages 50 to 59 years) [19]. (See 'Timing of exposure' below.)    The discrepancies in CHD risk between the unopposed estrogen and the combined estrogen-progestin trials suggest that the progestin played an important role in the increased CHD risk seen with combined therapy [20].    Other effects of unopposed estrogen included an increase in stroke and venous thromboembolic complications (similar to that seen with combined therapy). (See 'Stroke' below and 'Venous thromboembolism' below and \"Menopausal hormone therapy: Benefits and risks\".)    Predictors of risk -- Predictors of CHD risk were subsequently examined in a nested, case-control study of 359 CHD cases and 820 controls from the combined trials with the following results [21]:    ?After adjustment for the presence of cardiovascular disease, statin therapy, and diabetes mellitus, baseline levels of several thrombotic, inflammatory, and lipid biomarkers (including factor VIII, D-dimer, IL-6, low-density lipoprotein [LDL] cholesterol, high-density lipoprotein [HDL] cholesterol, and triglycerides) were associated with CHD events.    ?Only LDL cholesterol modified the " "effect of treatment, as women with high baseline LDL cholesterol concentrations were at greater risk for CHD if given MHT.    ?Combined therapy affected levels of several biomarkers (increases in CRP, HDL, triglycerides, and decreases in LDL), but the changes were not associated with a change in CHD risk.    Timing of exposure -- Data from a primate model [22], two observational studies in postmenopausal women [23,24], a meta-analysis of clinical trials [25], a coronary angiographic study [26], and secondary analyses from the [facility] [19,27] all suggest that the timing of exposure to MHT is an important factor in determining subsequent cardiovascular risk. The use of MHT in the early menopausal years does not appear to be associated with an excess risk of CHD when compared with older postmenopausal women. This has been referred to as the \"timing hypothesis.\"    The [facility] population was an older population (mean age 63 years) when compared with most observational studies; the older age at the time of MHT initiation would be expected to be associated with more subclinical atherosclerosis at baseline, with advanced or complex atherosclerotic lesions that may be more susceptible to the prothrombotic, proinflammatory effects of estrogen. In contrast, starting MHT soon after menopause may not cause harm (or may possibly be beneficial) because advanced, unstable atherosclerotic plaques have not yet formed. This hypothesis is supported by the following observations from the I:    ?In a combined analysis of the two I HT trials, women who started MHT closer to menopause appeared to have a lower risk of CHD compared with women further from menopause [27].    For the age groups of 50 to 59 years, 60 to 69 years, and 70 to 79 years, the HRs for CHD were 0.93, 0.98, and 1.26, with absolute excess risks of -2, -1, and +19 per 10,000 person-years, respectively. This trend did not reach statistical significance.    For women <10, 10 " to 19, or ?20 years since menopause, HRs for CHD were 0.76, 1.10, and 1.28, with absolute excess risks of -6, +4, and +17 per 10,000 person-years, respectively (p for trend = 0.02).    ?In the I-Coronary Artery Calcium Study, an ancillary substudy performed in younger women ages 50 to 59 years, evidence of subclinical atherosclerosis (as measured by coronary-artery calcium scores on electron beam computed tomography [CT]) was lower in those assigned to unopposed estrogen when compared with placebo [28].    ?The [facility] Early Estrogen Prevention Study (KEEPS), a four-year, randomized, double-blind, placebo-controlled trial in 727 women ages 45 to 54 years, reported that when combined with cyclical monthly oral progesterone, oral conjugated estrogen (0.45 mg daily) or transdermal estradiol (50 mcg daily) reduced menopausal symptoms and improved some markers of cardiovascular disease (increased HDL, decreased LDL with oral estrogen; decreased insulin resistance with transdermal estrogen) [29]. However, HT had no effect of surrogate markers of atherosclerosis progression (coronary artery calcium and carotid intima-medial thickness [CIMT]) when compared with placebo. The authors suggest that the lack of protective effect could be due to the young age and low risk profile of the subjects, the short duration of the trial, and the low doses of estrogen used.    ?In The Early versus Late Intervention Trial with Estradiol (ELITE), 643 postmenopausal women, stratified according to time since menopause (<6 or >10 years; early versus late, respectively), received oral estradiol (with progesterone for women with a uterus) or placebo for a median of five years [30]. Progression of subclinical atherosclerosis (measured as CIMT) was slower than placebo in the early intervention group, while rates of progression were similar to placebo in the late intervention group. Estradiol had no effect on CT coronary artery calcium in either  "the early or late intervention group.    ?A 2015 meta-analysis of 19 trials of oral (including the [facility]I), but not transdermal, MHT in over 40,000 postmenopausal women performed subgroup analyses in women who started MHT less than 10 years after menopause [31] (see \"Menopausal hormone therapy: Benefits and risks\", section on 'Younger, postmenopausal women'). A lower risk of CHD and mortality were reported. A 2017 meta-analysis of similar trials concluded that the risks of long-term use of MHT for prevention of chronic disease outweighed any benefits [32].    Most now agree that age or time since menopause influences the risk-benefit ratio associated with HT, most importantly with respect to CHD. The use of unopposed estrogen may be more favorable than combined estrogen-progestin (at least for the synthetic progestin used in the WHI, medroxyprogesterone acetate). In spite of these reassuring data, MHT is indicated only for management of menopausal symptoms in younger women, not for primary prevention of CHD [32,33]. For women who are recently menopausal, the improvement in vasomotor symptoms must still be weighed against other potential risks of treatment, but coronary disease is not usually a major factor in the equation.    Other specific recommendations regarding estrogen therapy are discussed elsewhere. (See \"Treatment of menopausal symptoms with hormone therapy\".)    Type of estrogen -- The type of estrogen may also be important in determining risk. Both the WHI and the HERS trials employed conjugated equine estrogen-progestin therapy (see 'Secondary prevention' below). In comparison, the Estrogen in Prevention of Atherosclerosis Trial (EPAT) randomly assigned 222 healthy, postmenopausal women to unopposed oral 17-beta-estradiol (1 mg/day) or placebo [13]. Estradiol therapy was associated with a decreased risk of atherosclerosis (as measured by CIMT). Higher serum concentrations of sex hormone-binding globulin " (SHBG) and estrogen were inversely correlated with CIMT progression after controlling for age and BMI. The association was partially mediated by beneficial effects on lipids [34].    However, 17-beta-estradiol may not be effective when given with a progestin. This was illustrated in a trial of 321 postmenopausal women in whom the combination of 17-beta-estradiol (1 mg/day) with a progestin (gestodene) produced no reduction in intima-medial progression after one year [35].    Data from one population-based, case-control study suggest a possible lower risk of myocardial infarction and stroke with esterified estrogens when compared with conjugated estrogens [36]. This observation has not been confirmed in clinical trials.    Secondary prevention -- Although a number of observational and angiographic studies had strongly suggested that women with CHD derive the greatest benefit for prevention of subsequent coronary events and survival [37-41], clinical trial data have not confirmed these benefits [42-51].    The HERS-I trial was a randomized, blinded, placebo-controlled secondary prevention trial [42]. In this study, 2763 postmenopausal women under the age of 80 with a history of CHD were randomly assigned to receive the same regimen used in the WHI (0.625 mg of conjugated equine estrogen plus 2.5 mg of medroxyprogesterone acetate daily) or placebo for an average of four years [42]. Findings from this trial included:    ?There was no significant difference between the two groups in the incidence of CHD events despite a net 11 percent decrease in serum LDL cholesterol concentrations and 10 percent increase in serum HDL cholesterol concentrations in the hormone cohort.    ?More CHD events occurred in the hormone group during the first year of therapy, with a subsequent trend toward a reduction in risk in years 4 and 5.    ?A post-hoc analysis of HERS-I did not identify any subgroup in which estrogen therapy was beneficial  "or harmful [47]. However, polymorphisms of platelet genes were reported to be predictors of CHD risk in a subsequent report [52].    ?In another post hoc analysis, combined oral estrogen and progestin therapy appeared to reduce the risk of developing type 2 diabetes mellitus. However, this effect is insufficient to recommend HT as a diabetes prevention strategy in women with CHD. (See \"Menopausal hormone therapy: Benefits and risks\".)    The HERS-II trial was a continuation of HERS-I in which 93 percent of HERS-I participants enrolled in an unblinded follow-up study for 2.7 years [43]:    ?The lower rates of CHD events in the estrogen-progestin group in years 4 and 5 did not persist in the follow-up years.    ?Over the 6.8 years of HERS-I and HERS-II, continuous estrogen-progestin therapy did not reduce the risk of CHD events in women with established CHD (HR 0.99, 95% CI 0.84-1.14) (figure 3).    ?There were no differences in women taking statins or aspirin.    Other data have confirmed the lack of efficacy of estrogen-progestin therapy for secondary prevention of CHD [15,45,46,48,49]. As examples:    ?In a subgroup analysis of women with preexisting CHD in the [facility]I combined estrogen-progestin trial, subsequent risk of a CHD event was increased (HR 1.44, 95% CI 0.77-2.70), although the number of women in this group was small [15,17].    ?In the Women's Angiographic Vitamin and Estrogen (WAVE) Trial, combined estrogen-progestin therapy (the same regimen as HERS and WHI) was associated with a nonsignificant worsening of coronary stenosis on angiography [49]. When patients with intercurrent death or myocardial infarction were included, a significant increase in coronary risk was seen. An antioxidant arm (vitamins C and E) of the trial also demonstrated possible harm.    Unopposed estrogen and type of estrogen -- Results from two trials suggest that neither unopposed estrogen nor a different type of orally administered " estrogen is effective for secondary prevention of CHD, but an increase in CHD risk, as has been reported for combined estrogen-progestin therapy, has not been described.    ?In the [facility]I trial of unopposed estrogen, a subgroup analysis of 441 women with preexisting CHD suggested that the effect of estrogen versus placebo on CHD risk was similar to that seen in women with no documented CHD (HR 1.04 and 0.91 for women with or without previous CHD, respectively). The discrepancies in CHD risk between the unopposed estrogen trial and the combined estrogen-progestin trial suggest that the progestin played an important role in the increased CHD risk seen with combined therapy [20].    ?In the [facility] trial, there was no significant difference at two years between unopposed estradiol (estradiol valerate 2 mg) and placebo in the primary endpoint of reinfarction or cardiac death [50].    ?The Women's Estrogen-Progestin Lipid-Lowering Hormone Atherosclerosis Regression Trial ([facility]) randomly assigned 226 postmenopausal women with established CHD to oral micronized 17-beta-estradiol (1 mg/day) alone or with medroxyprogesterone acetate (5 mg daily for 12 consecutive days each month), or to usual care (control group) [51]. After a median of 3.3 years of follow-up, estradiol either alone or with sequentially added medroxyprogesterone acetate had no significant effect on progression of atherosclerosis as measured by quantitative angiography (increases of 2.2, 1.2, and 1.9 percent in the unopposed estrogen, combined estrogen-progestin, and control groups, respectively).    Transdermal estrogen -- Transdermal estrogen has more favorable effects than oral estrogen on markers for cardiovascular risk [40], may be less thrombogenic [53], and may be associated with a lower risk of thromboembolism than oral estrogens (see 'Venous thromboembolism' below). However, there is no evidence to date that transdermal estrogen is safer for  secondary prevention of CHD.    In the [facility] trial, 255 postmenopausal women with ischemic heart disease were randomly assigned to transdermal preparations (estrogen alone or combined with progestin) or placebo. After an average of 31 months of follow-up, there was a nonsignificant increase in coronary disease-related events in the transdermal MHT group when compared with placebo [54].    STROKE  The results of epidemiologic studies of estrogen therapy and stroke risk are conflicting. The findings range from a significant reduction of risk in the National Health and Nutritional Examination Survey (NHANES) (relative risk [RR] 0.37 after adjusting for other risk factors) [55], to no effect in a large case-control study [56], to an increase only in the first six months of therapy [57], to a slight increase in stroke risk in the Nurses' Health Study (RR 1.35) [11].    However, clinical trial data suggest that oral estrogen therapy increases stroke risk [58]:    ?In the Heart and Estrogen/Progestin Replacement Study (HERS-I) trial of women with established coronary heart disease (CHD) described above, there was a strong trend toward an increase in risk for fatal stroke (RR 1.61, 95% CI 0.97-3.55) [59].    ?In the Women's Estrogen for Stroke Trial (WEST), a double-blind, placebo-controlled trial in 664 postmenopausal women with established cerebrovascular disease, estrogen (oral estradiol 1 mg/day for an average of 2.8 years) had no effect on recurrent stroke or death (RR 1.0). Estrogen therapy did, however, increase the risk of fatal stroke (RR 2.9, 95% CI 0.9-9.0) [60]. In the Women's Health Initiative (WHI) trial, a 31 percent increase in stroke risk was seen with combined estrogen-progestin use compared with placebo (intention-to-treat hazard ratio [HR] 1.31, 95% CI 1.02-1.68). The hazard ratios for ischemic and hemorrhagic stroke were 1.44 (95% CI 1.09-1.90) and 0.82 (95% CI 0.43-1.56), respectively [61]. The  increased risk became evident between one and two years after randomization (figure 1) and was seen in all age groups. In the WHI trial of unopposed estrogen, stroke risk was significantly increased with conjugated equine estrogen versus placebo (HR 1.3, 95% CI 1.1-1.77) [16,62]. The excess risk of stroke was similar in women with or without previous stroke (HRs 1.67 and 1.39, respectively) [16]. In other subgroup analyses, the excess risk of stroke appeared to be present in women of all ages, including younger and recently menopausal women [62].    ?In the follow-up analysis that combined the two WHI menopausal hormone therapy (MHT) trials (including 13 additional cases of stroke), stroke risk did not vary significantly by age or time since menopause (HR 1.32, 95% CI 1.12-1.56) [27]. However, the low baseline risk and modest HR in women ages 50 to 59 years resulted in no absolute excess risk in stroke (see 'Timing of exposure' above). Similar results were reported by the Nurses' Health Study [63].    ?In a meta-analysis of randomized trials (including HERS, WEST, and WHI), oral estrogen therapy (with or without progestin) was associated with an increase in ischemic stroke but not hemorrhagic stroke or transient ischemic attacks (odds ratio [OR] 1.29, 95% CI 1.06-1.56 for ischemic stroke) [64]. In addition, among women who had a stroke, there was a trend towards more fatal stroke in those who were taking oral estrogen therapy.    Transdermal versus oral route -- Low-dose, transdermal estrogen therapy does not appear to increase the risk of stroke in postmenopausal women. This was illustrated in a population-based, nested case-control study that included approximately 16,000 cases of stroke and 60,000 controls [65]. The overall rate of stroke in the cohort was 2.85 per 1000 per year. The risk of stroke was not increased in low-dose (?50 mcg patch) transdermal estrogen users compared with nonusers (rate ratio 0.81,  95% CI 0.62-1.05), but was increased with higher transdermal doses (>50 mcg patch; 1.89, 95% CI 1.15-3.11). Current users of both low-dose (?0.625 mg equine estrogens or ?2 mg estradiol) and high-dose (>0.625 mg equine estrogens or >2 mg estradiol) oral HT had a higher rate of stroke than nonusers (rate ratio 1.28, 95% CI 1.15-1.42).    PERIPHERAL ARTERY DISEASE  Estrogen therapy does not appear to confer protection against peripheral artery disease. Although a population-based study suggested that estrogen therapy for one year or more was associated with a decreased risk of peripheral artery disease [66], this was not confirmed in the Heart and Estrogen/Progestin Replacement Study (HERS-I) trial described above in which therapy with estrogen and progestin did not significantly reduce the incidence of peripheral arterial events, defined as carotid disease, abdominal aortic aneurysm, and lower extremity arterial disease (relative hazard 0.87 compared with placebo) [67].    In the Women's Health Initiative (WHI), which used a similar case definition for peripheral artery disease, combined conjugated estrogen-medroxyprogesterone acetate therapy neither favorably nor unfavorably affected risk of peripheral arterial events (hazard ratio [HR] 0.89, 95% CI 0.63-1.25) [68]. Data from the I unopposed estrogen trial are not yet available.    VENOUS THROMBOEMBOLISM  Observational studies, a small trial, and a meta-analysis published before the Women's Health Initiative (WHI) evaluated the association between estrogen therapy and venous thromboembolism (VTE) and suggested that hormone therapy (HT) caused approximately a twofold increase in VTE risk [69-74], particularly in women with the factor V Leiden mutation [75-77]. A limitation in interpreting these reports is that they had significant design limitations (eg, inconsistent or unreliable methods for diagnosing VTE, widely varying patient ages, lack of adjustment for  "confounding factors, and marked variations in estrogen preparation and dosing).    Women's Health Initiative -- The most definitive clinical data come from the WHI trial and are consistent with previous estimates [78]:    ?VTE risk was increased approximately twofold in the estrogen-progestin group compared with placebo (hazard ratio [HR] 2.06, unadjusted 95% CI 1.6-2.7). The increase in risk was similar for both deep vein thrombosis and pulmonary embolism, and it was particularly high in women with a previous event (HR 3.9).    ?The rates of VTE were 3.5 and 1.7 per 1000 person-years in the estrogen-progestin and placebo groups, respectively.    ?The increased risk was highest in the first year of therapy but persisted for the five years of follow-up (figure 2).    ?Both older age and obesity were associated with additional excess risk, but the risk was not significantly altered by smoking, aspirin, or statin use.    ?The presence of factor V Leiden further increased the risk of VTE in women receiving estrogen-progestin therapy compared with placebo (HR 6.7, 95% CI 3.1-14.5). Other genetic variants did not modify the risk of VTE with estrogen therapy. (See \"Overview of the causes of venous thrombosis\", section on 'Inherited thrombophilia'.)    VTE risk in the WHI was also increased with unopposed conjugated equine estrogen when compared with placebo (HR 1.32, 95% CI 0.99-1.75) [79], which was lower than that seen in the combined estrogen-progestin trial [78]. The rate of VTE was 3.0 and 2.2 per 1000 person-years for unopposed estrogen and placebo, respectively [79].    A similar twofold increase in VTE risk with combined estrogen-progestin therapy was noted in the Heart and Estrogen/Progestin Replacement Study (HERS) trials [42]. There was a suggestion that risk was slightly decreased in women who also took daily aspirin (HR 1.68 and 4.23, unadjusted 95% CI 0.96-2.92 and 1.41-12.7 for aspirin versus no aspirin " therapy, respectively).    In a systematic review and meta-analysis of 22 randomized trials of menopausal hormone therapy (MHT) (including both WHI trials), the risk of VTE was increased. After one year of use, the risk increased from 2 per 1000 to 2 to 10 per 1000 for combined estrogen-progestin therapy and to 4 to 11 per 1000 for unopposed estrogen therapy [32].    Mechanism -- One possible mechanism for the increased VTE risk is an increase in activated protein C resistance that has been reported with both unopposed estradiol and combined estradiol-progestin therapy [80].    Type of oral estrogen -- The type of oral estrogen used may affect VTE risk. As an example, in a population-based, case-control study of desirae- and postmenopausal women ages 30 to 89 years (586 with venous thrombosis and 2268 healthy controls), women taking conjugated estrogens, but not esterified (plant-derived) estrogens, were at increased risk of VTE when compared with non-estrogen users (odds ratio [OR] 1.7, 95% CI 1.2-2.2 and 0.9, 95% CI 0.7-1.2, respectively) [81]. Among estrogen users, the risk was higher when combined with progestin compared with unopposed estrogen use (OR 1.6, 95% CI 1.1-2.3).    Route of estrogen -- Transdermal estrogen, which has little effect on hemostasis, may be associated with a lower VTE risk. This was illustrated in a multicenter, case-control study in postmenopausal women that included 271 cases of VTE and 610 controls matched for age, center, and admission date [82]. The odds ratios for VTE in current users of oral or transdermal estrogen compared with nonusers were 4.2 (95% CI 1.5-11.6) and 0.9 (95% CI 0.4-2.1), respectively. In a prospective cohort study from the same investigators, neither medroxyprogesterone acetate nor micronized progesterone appeared to be associated with VTE risk, while norpregnane progestins (nomegestrol acetate and promegestone) were associated with excess risk [83].    In a  meta-analysis that included the case-control study [82], as well as three others, no excess risk of VTE was observed in women taking transdermal estrogen (OR 1.2, 95% CI 0.1-1.7), even in those with prothrombotic mutations or high body mass index (BMI) [84]. Similar results were reported in a population-based cohort study published after the meta-analysis [85]. The cohort included 23,505 cases of VTE matched with 231,562 controls. The risk of VTE was not increased with transdermal estrogen alone (relative risk [RR] 1.01, 95% CI 0.89-1.16) or combined with progestin (RR 0.96, 95% CI 0.77-1.20). In contrast, VTE risk was increased with current use of oral estrogen alone (RR 1.49, 95% CI 1.37-1.63), estrogen combined with a progestin (RR 1.54, 95% CI 1.44-1.65), and increased with estrogen dose. Risks were highest during the first year and disappeared four months after stopping therapy. There are currently no clinical trial data comparing the effect of transdermal and oral estrogen preparations on VTE risk.    Prothrombotic mutations -- As noted in the WHI, the presence of factor V Leiden, but not other prothrombotic mutations, further increased the risk of VTE in women receiving estrogen-progestin therapy compared with placebo. In a case-control study of 235 postmenopausal women with documented VTE and 554 control subjects without VTE, transdermal estrogen, unlike oral estrogen, did not confer additional risk in women who carried a prothrombotic mutation [86].    Dose -- Higher doses of estrogen, such as those used in oral contraceptives, may be associated with higher VTE risks when compared with MHT doses [87]. In addition, low-dose MHT (such as 0.3 mg conjugated estrogen) has fewer effects on coagulation and inflammatory markers than standard-dose therapy [88].    In summary, there is a small but significant increase in risk of thromboembolism with current HT, but for healthy postmenopausal women, the absolute risk  "is extremely low. Older age, obesity, and the presence of factor V Leiden may be associated with additional excess risk, but the risk does not appear to be altered by smoking, aspirin, or statin use.    Although thromboembolic risk may be affected by the type and dose of estrogen and the route of its administration, data are not yet sufficient to recommend one type of estrogen over another. Recommendations for MHT use are reviewed separately. (See \"Treatment of menopausal symptoms with hormone therapy\".)    Type of progestin -- In addition to the type and route of estrogen administration, the type of progestin may also affect the risk of VTE. In a study of over one million postmenopausal women that included 2200 venous thromboembolic events, the relative risk of VTE in current hormone users versus nonusers was higher for women taking oral estrogen-progestin than unopposed estrogen regimens (RR 2.07 versus 1.42) [89]. Transdermal estrogen users had no excess risk. Among the oral estrogen-progestin users, risk was greater for regimens containing medroxyprogesterone acetate than other progestins (RR 2.67 versus 1.91).    The estimated absolute risk of being admitted to the hospital (or mortality from) pulmonary embolism was:    ?1 in 660 never users of HT    ?1 in 475 current users of oral estrogen-only HT    ?1 in 390 users of estrogen-progestin HT containing norethisterone/norgestrel    ?1 in 250 users of estrogen-progestin HT containing medroxyprogesterone acetate    LIPIDS  Oral estrogen has a proven beneficial effect on serum lipid concentrations [90-92], which can be negated in part by progestin therapy. One study, for example, randomly assigned women to treatment with 0.625 mg of conjugated equine estrogens or placebo [90]. Estrogen had the following effects on mean serum lipid concentrations:    ?Low-density lipoprotein (LDL) cholesterol fell by 15 percent    ?High-density lipoprotein (HDL) cholesterol " "increased by 16 percent    ?Triglycerides increased by 24 percent    These effects appear to be independent of age, with similar results noted in women over age 74 years [92]. Estrogen may also lower lipoprotein(a) [Lp(a)] levels by approximately 20 percent [91,93,94]. (See \"Lipoprotein(a)\".)    The effect of oral estradiol (1 mg/day) is similar to that of oral conjugated equine estrogen, while transdermal estradiol has a lesser effect than oral estrogen preparations [90]. The lack of effect with transdermal estrogen presumably reflects decreased exposure of the liver to estrogen (compared with oral therapy) due to avoidance of the first-pass effect on the liver. However, another study which treated women for much longer with varying doses of transdermal estradiol found that the serum total and LDL cholesterol concentrations decreased in a dose-dependent fashion after two years of therapy [95].    Polymorphisms of the estrogen receptor-alpha (ER-alpha) gene may be associated with an augmented HDL cholesterol rise with estrogen therapy. In a study of 309 postmenopausal women with coronary disease participating in the Estrogen Replacement and Atherosclerosis (ERA) trial (see 'Secondary prevention' above), those with an [id number] C/C genotype had a more significant increase in serum HDL cholesterol (limited to subfraction 3) with estrogen than other women (13.1 versus 6.0 mg/dL [0.34 versus 0.16 mmol/L], respectively, p = 0.04) [96]. There were similar changes in several closely related genotypes and in women receiving either unopposed estrogen or combined estrogen-progestin therapy. Thus, certain ER-alpha polymorphisms may predict the serum HDL cholesterol response to estrogen therapy, but the clinical consequences of this effect are unknown. (See \"HDL cholesterol: Clinical aspects of abnormal values\".)    Women with hyperlipidemia -- The preceding observations were made in healthy postmenopausal women. The effect of " hormone replacement in women with hypercholesterolemia (baseline serum cholesterol concentration 305 mg/dL [7.9 mmol/L]) was evaluated in a study of 58 postmenopausal women randomly assigned to 1.25 mg conjugated estrogen with medroxyprogesterone acetate 5 mg/day or simvastatin (10 mg/day) in a crossover design (with an eight-week washout in between) [91]. Compared with simvastatin, menopausal hormone therapy (MHT) produced the following changes:    ?A smaller decrease in total cholesterol - 14 versus 26 percent with simvastatin    ?A smaller decrease in LDL cholesterol - 24 versus 36 percent    ?A similar elevation in HDL cholesterol - 7 percent    ?A reduction in Lp(a) - 27 percent versus no change with simvastatin    ?An increase in triglycerides - 29 percent versus 14 percent decrease with simvastatin    One limitation of this trial is that the dose of conjugated estrogen used (1.25 mg/day) was twice the usual replacement dose. Nevertheless, the percentage changes in lipid values are similar to those seen in the other studies of postmenopausal women [90,93]. Furthermore, another trial of hypercholesterolemic postmenopausal women showed similar results when estrogen was compared with pravastatin [97]. In terms of serum LDL cholesterol, combination therapy appears to be better than estrogen alone but not better than a statin alone [97,98].    Hyperlipidemic women who are receiving combined cyclic estrogen-progestin therapy may have significant fluctuations in lipoprotein concentrations depending upon the phase of the cycle [99]. Thus, when considering lipid-lowering therapy, it is important to determine if this fluctuation is occurring in an individual woman and, if so, to consistently measure lipoproteins during the same hormonal phase (preferably the one which demonstrates the poorest results).    Because of a presumed cardioprotective effect and the above observations, estrogen was recommended in the past as  "first-line therapy for hyperlipidemia in women with coronary heart disease (CHD) [100]. However, this recommendation is no longer applicable because of the lack of cardiovascular protection noted in the Women's Health Initiative (WHI) and Heart and Estrogen/Progestin Replacement Study (HERS) trials [15,42,43,101]. Statins should be the first choice for lipid-lowering therapy in most women with CHD. (See \"Management of low density lipoprotein cholesterol (LDL-C) in the secondary prevention of cardiovascular disease\".)    Effect of progestins -- A progestin is routinely administered with estrogen in women with an intact uterus to diminish the risk of endometrial hyperplasia cancer. (See \"Menopausal hormone therapy: Benefits and risks\".)    Progestins attenuate some of the beneficial lipid effects of estrogen. Both medroxyprogesterone acetate and levonorgestrel decrease serum HDL cholesterol by 8 to 18 percent [102]. However, the net effect of oral estrogen plus medroxyprogesterone acetate on serum HDL cholesterol is still beneficial, although not as beneficial as when estrogen is given alone [103,104]. Furthermore, addition of a progestin has little effect on the estrogen-induced reduction in serum LDL cholesterol (figure 2) [103-105].    The progestin in both the WHI and HERS trials was medroxyprogesterone acetate (2.5 mg/day). In the WHI, serum LDL cholesterol concentrations decreased, while serum HDL cholesterol and triglyceride concentrations increased more in the HT group than placebo (-12.7, +7.3, and +6.9 percent, respectively) [15].    In the [facility]I trial of unopposed estrogen (conjugated estrogen 0.625 mg/day), the changes in serum total, LDL, and HDL cholesterol concentrations were -2.3, -13.7, and +15.1 percent, respectively [16]. Serum triglyceride concentrations increased by 25 percent in the estrogen group. Further details of the WHI trials are found elsewhere. (See \"Menopausal hormone therapy: Benefits and " "risks\".)    Type of progestin -- The type of progestin appears to be important with regard to serum HDL cholesterol. medroxyprogesterone acetate lowers serum HDL cholesterol much less than levonorgestrel [102], while oral micronized progesterone seems to have little or no adverse effect [102,103,106]. This issue was best addressed in the Postmenopausal Estrogen/Progestin Interventions (PEPI) trial in which 875 women were randomly assigned to placebo or one of four treatment arms consisting of conjugated estrogen (0.625 mg/day) alone or with cyclic or continuous medroxyprogesterone or cyclic micronized progesterone (200 mg/day for 12 days per month) [103]. Serum HDL cholesterol concentrations priscilla by 5.6 mg/dL (0.14 mmol/L) with estrogen alone, 4.1 mg/dL (0.11 mmol/L) with estrogen plus micronized progesterone, and only 1.2 to 1.6 mg/dL (0.03 to 0.04 mmol/L) with medroxyprogesterone (figure 4). Estrogen, with or without progestin, reduced plasma Lp(a) levels by 17 to 23 percent [107].    Continuous versus cyclic combined regimens -- A meta-analysis evaluated the results in over 300 women from six randomized, double-blind, clinical trials comparing continuous estrogen-progestin therapy (0.625 mg conjugated estrogens and 2.5 mg medroxyprogesterone, the most commonly prescribed regimen in the [place]) and cyclic therapy [108]. Overall, the effects were equivalent with the two regimens:    ?A reduction in serum total cholesterol concentration of 14 to 15 mg/dL (0.36 to 0.39 mmol/L)    ?A reduction in serum LDL cholesterol concentration of 17 to 18 mg/dL (0.44 to 0.46 mmol/L)    ?An elevation in serum HDL cholesterol concentration of 2 to 3 mg/dL (0.05 to 0.08 mmol/L)    Effect on triglycerides -- A potentially negative effect of estrogen therapy is its tendency to raise triglyceride concentrations by as much as 24 to 29 percent [90,91], although a smaller increase of 6.9 percent was noted in the WHI combined " "estrogen-progestin, but not the unopposed estrogen trial [15,16] (see \"Hypertriglyceridemia in adults: Management\"). Transdermal estrogen preparations do not raise triglycerides concentrations [108].    BLOOD PRESSURE  Replacement doses of estrogen have little effect on blood pressure. The Women's Health Initiative (WHI) combined estrogen-progestin trial noted only a small increase (1.5 mmHg) in systolic pressure compared with placebo [15]; a similar difference between the hormone and placebo groups of 1.1 mmHg was noted in the [facility]I trial of unopposed estrogen [16].    Similarly, the Postmenopausal Estrogen/Progestin Interventions (PEPI) trial found that estrogen, with or without progestins, did not affect blood pressure [103]. These findings are in contrast to the frequent elevation in blood pressure seen when higher doses of estrogen are given for oral contraception. (See \"Contraception: Hormonal contraception and blood pressure\".)    BODY WEIGHT  Several reports suggest that the effect of estrogen on body weight is either neutral or slightly beneficial. A prospective, nonrandomized study of the [facility] cohort of 651 women followed for 15 years found that estrogen therapy had no effect on body weight [109], while the randomized, placebo-controlled Postmenopausal Estrogen/Progestin Interventions (PEPI) trial found the women treated with estrogen gained significantly less weight than those treated with placebo [103]. A large osteoporosis prevention trial also reported that postmenopausal women receiving hormone therapy (HT) gained less weight than women receiving placebo [110].    Body fat distribution -- Estrogen therapy also has a neutral or favorable effect on body fat distribution [109,111]. The pattern of regional fat distribution plays an important additional role in the excess death associated with obesity. Abdominal obesity (also called upper body, male-type, android, or visceral obesity) is " "associated with an increased risk of hypertension, diabetes, and dyslipidemia compared with female-type or gynoid obesity, in which the excess weight accumulates in the femoral and gluteal regions. (See \"Overweight and obesity in adults: Health consequences\".)    The [facility] cohort of 651 women found that body fat distribution was similar in women who did or did not take estrogen [109].    INFORMATION FOR PATIENTS  [facility] offers two types of patient education materials, \"The Basics\" and \"Beyond the Basics.\" The Basics patient education pieces are written in plain language, at the 5th to 6th grade reading level, and they answer the four or five key questions a patient might have about a given condition. These articles are best for patients who want a general overview and who prefer short, easy-to-read materials. Beyond the Basics patient education pieces are longer, more sophisticated, and more detailed. These articles are written at the 10th to 12th grade reading level and are best for patients who want in-depth information and are comfortable with some medical jargon.    Here are the patient education articles that are relevant to this topic. We encourage you to print or e-mail these topics to your patients. (You can also locate patient education articles on a variety of subjects by searching on \"patient info\" and the keyword(s) of interest.)    ?Basics topics (see \"Patient education: Menopause (The Basics)\")    ?Beyond the Basics topics (see \"Patient education: Menopause (Beyond the Basics)\" and \"Patient education: Menopausal hormone therapy (Beyond the Basics)\")    SUMMARY    ?Although initial publications from the Women's Health Initiative (WHI) reported an excess risk of coronary heart disease (CHD) with combined estrogen-progestin therapy, secondary analyses from the WHI data from a primate model, two observational studies in postmenopausal women, meta-analyses of clinical trials, and a coronary " "angiographic study all suggest that the excess CHD risk appears to be confined to older postmenopausal women or those who are >10 years postmenopause. This has been referred to as the \"timing hypothesis.\" (See 'Timing of exposure' above.)    ?In a combined analysis of the two I menopausal hormone therapy (MHT) trials, an increased risk of stroke was observed, that did not vary significantly by age or time since menopause (hazard ratio [HR] 1.32, 95% CI 1.12-1.56). However, the low baseline risk and modest HR in women ages 50 to 59 years resulted in no absolute excess risk in stroke. Low doses of transdermal estrogen do not appear to be associated with an excess risk of stroke. (See 'Stroke' above.)    ?There is a small but significant increase in risk of venous thromboembolism (VTE) with current hormone therapy (HT), but for healthy postmenopausal women, the absolute risk is extremely low. Transdermal estrogen preparations do not appear to be associated with excess risk. Older age, obesity, and the presence of factor V Leiden may be associated with additional excess risk. (See 'Venous thromboembolism' above.)    ?The management of menopausal symptoms with HT is reviewed separately. (See \"Treatment of menopausal symptoms with hormone therapy\" and \"Menopausal hot flashes\".)    Use of UpToDate is subject to the Terms of Use.  REFERENCES  Mp JL, Stacie DP, Sebastian KA, Markie PM. Hormone therapy: physiological complexity belies therapeutic simplicity. Science 2004; 304:1269.  Solomon CL, Haydee JE. Menopausal Hormone Therapy and Cardiovascular Disease: The Role of Formulation, Dose, and Route of Delivery. J Clin Endocrinol Metab 2021; 106:1245.  Dayami HN, Iain RUBIOJ. Menopausal Hormone Replacement Therapy and Reduction of All-Cause Mortality and Cardiovascular Disease: It Is About Time and Timing. Cancer J 2022; 28:208.  Rosahenrry GM, Kathryn C, Judy M. Hormone replacement therapy and cardioprotection: what is good and " what is bad for the cardiovascular system? [place] N Y Acad Sci 2006; 1092:341.  Be J, Michelle DM, Onel D, et al. Effects of estrogen replacement with and without medroxyprogesterone acetate on brachial flow-mediated vasodilator responses in postmenopausal women with coronary artery disease. Am Heart J 2007; 153:439.  Dio JF, Fabián BL, Evan LA. Haemostatic activation in post-menopausal women taking low-dose hormone therapy: less effect with transdermal administration? Thromb Haemost 2007; 97:558.  Sumino H, Ichikawa S, Kasama S, et al. Different effects of oral conjugated estrogen and transdermal estradiol on arterial stiffness and vascular inflammatory markers in postmenopausal women. Atherosclerosis 2006; 189:436.  Wakevonsuki A, Okatani Y, Iklitue N, Fukaya T. Effect of medroxyprogesterone acetate on endothelium-dependent vasodilation in postmenopausal women receiving estrogen. Circulation 2001; 104:1773.  Miller DB, Palla SL, Uday P, et al. Progestins affect mechanism of estrogen-induced C-reactive protein stimulation. Am J Med 2006; 119:167.e1.  Haydee CASTANEDA, Sebastian PATRICK. Clinical practice. Postmenopausal hormone-replacement therapy. N Engl J Med 2001; 345:34.  Grodstein F, Haydee CASTANEDA, Kg GA, et al. A prospective, observational study of postmenopausal hormone therapy and primary prevention of cardiovascular disease. [place] Intern Med 2000; 133:933.  Houston TL, Arley-Serafin E, Vangie LD, et al. Cardiovascular mortality and noncontraceptive use of estrogen in women: results from the Lipid Research Clin[facility] Program Follow-up Study. Circulation 1987; 75:1102.  Dayami HN, Iain WJ, Bird RA, et al. Estrogen in the prevention of atherosclerosis. A randomized, double-blind, placebo-controlled trial. [place] Intern Med 2001; 135:939.  Main RC, Adelina S, Segundo WD, et al. Estrogen status correlates with the calcium content of coronary atherosclerotic plaques in women. J Clin Endocrinol Metab 2002;  87:1062.  Jess CASTANEDA, Carlitos MATIAS, Jayy RL, et al. Risks and benefits of estrogen plus progestin in healthy postmenopausal women: principal results From the Women's Health Initiative randomized controlled trial. MICHAEL 2002; 288:321.  Carlitos MATIAS, Jasmina EUGENE, Adalberto AR, et al. Effects of conjugated equine estrogen in postmenopausal women with hysterectomy: the Women's Health Initiative randomized controlled trial. MICHAEL 2004; 291:1701.  Haydee CASTANEDA, Peter HUANG, Erich NICK, et al. Estrogen plus progestin and the risk of coronary heart disease. N Engl J Med 2003; 349:523.  Baron MR, John AH, Barbara B, et al. Main morbidities recorded in the women's international study of long duration oestrogen after menopause (WISDOM): a randomised controlled trial of hormone replacement therapy in postmenopausal women. BMJ 2007; 335:239.  Peter HUANG, Heriberto MAYS, Haydee CASTANEDA, et al. Conjugated equine estrogens and coronary heart disease: the Women's Health Initiative. Arch Intern Med 2006; 166:357.  Denny SB, Yves HOFFMAN. The WHI estrogen-alone trial--do things look any better? MICHAEL 2004; 291:1769.  Jess CASTANEDA, Marie M, Eder P, et al. Inflammatory, lipid, thrombotic, and genetic markers of coronary heart disease risk in the women's health initiative trials of hormone therapy. Arch Intern Med 2008; 168:2245.  Emmanuel TS, Zeenat TB. Estrogen replacement therapy, atherosclerosis, and vascular function. Cardiovasc Res 2002; 53:605.  Bridget F, Haydee CASTANEDA, Stampracheal SALGADO. Hormone therapy and coronary heart disease: the role of time since menopause and age at hormone initiation. J Womens Health ([place]) 2006; 15:35.  Brian DO, Delgado Y, العراقي KD, et al. Age-specific effects of hormone therapy use on overall mortality and ischemic heart disease mortality among women in the [place] [employer] Study. Menopause 2011; 18:253.  Jacqueline SR, Jamshid JM, Camryn E, Jacqueline EE. Brief report: Coronary heart disease events associated with hormone  therapy in younger and older women. A meta-analysis. J Gen Intern Med 2006; 21:363.  Shjac CL, Erich BD, Esa SL, et al. Timing of hormone therapy, type of menopause, and coronary disease in women: data from the National Heart, Lung, and Blood [place]-sponsored Women's Ischemia Syndrome Evaluation. Menopause 2011; 18:943.  Jess CASTANEDA, Jayy RL, Haydee CASTANEDA, et al. Postmenopausal hormone therapy and risk of cardiovascular disease by age and years since menopause. MICHAEL 2007; 297:1465.  Haydee CASTANEDA, Dia MAYO, Jess CASTANEDA, et al. Estrogen therapy and coronary-artery calcification. N Engl J Med 2007; 356:2591.  Livingston SM, Black DM, Naftolin F, et al. Arterial imaging outcomes and cardiovascular risk factors in recently menopausal women: a randomized trial. [place] Intern Med 2014; 161:249.  Dayami ROSE, Iain RUBIOJ, Malcom VW, et al. Vascular Effects of Early versus Late Postmenopausal Treatment with Estradiol. N Engl J Med 2016; 374:1221.  Traci HM, Triston L, Rashawn A, et al. Hormone therapy for preventing cardiovascular disease in post-menopausal women. [place] Database Syst Rev 2015; :[id number].  Que J, Leonel C, Trivedi H, et al. Long-term hormone therapy for perimenopausal and postmenopausal women. [place] Database Syst Rev 2017; 1:[id number].  Haydee CASTANEDA, Himanshu SS. Invited commentary: hormone therapy and risk of coronary heart disease why renew the focus on the early years of menopause? Am J Epidemiol 2007; 166:511.  Boom R, Dayami ROSE, Taniya FZ, et al. Relationship between serum levels of sex hormones and progression of subclinical atherosclerosis in postmenopausal women. J Clin Endocrinol Metab 2008; 93:131.  Jagdish P, Rashawnk S, Charlette W, et al. Effect of oral postmenopausal hormone replacement on progression of atherosclerosis : a randomized, controlled trial. Arterioscler Thromb Vasc Biol 2001; 21:262.  Jose RN, Autumn NS, Omid NL, et al. Esterified estrogen and conjugated equine estrogen  and the risk of incident myocardial infarction and stroke. Arch Intern Med 2006; 166:399.  Gildardo JH Jr, Minna SC, Silva RR, et al. Estrogen replacement therapy after coronary angioplasty in women. J Am Kyler Cardiol 1997; 29:1.  Hipolito RODRIGUEZ, Brennen F, Porsha Vences R, Scott KB. Effect on survival of estrogen replacement therapy after coronary artery bypass grafting. Am J Cardiol 1997; 79:847.  Perry JE, Bolivar ED, Matheus GP, et al. Relation between estrogen replacement therapy and restenosis after percutaneous coronary interventions. J Am Kyler Cardiol 1996; 28:1111.  Hipolito RODRIGUEZ, Porsha MEDINA, Cristóbal SHAUNA, et al. Estrogen replacement and coronary artery disease. Effect on survival in postmenopausal women. Arch Intern Med 1990; 150:2557.  Naeyli MG, Agustin BG, Tevin AS, et al. Hormone therapy and in-hospital survival after myocardial infarction in postmenopausal women. Circulation 2001; 104:2300.  Denny PENNINGTON, Yves HOFFMAN, Houston T, et al. Randomized trial of estrogen plus progestin for secondary prevention of coronary heart disease in postmenopausal women. Heart and Estrogen/progestin Replacement Study (HERS) Research Group. MICHAEL 1998; 280:605.  Yves HOFFMAN, Nadya HOFFMAN, Steven V, et al. Cardiovascular disease outcomes during 6.8 years of hormone therapy: Heart and Estrogen/progestin Replacement Study follow-up (HERS II). MICHAEL 2002; 288:49.  Denny PENNINGTON, Louie ANTHONY, Edvin E, et al. Noncardiovascular disease outcomes during 6.8 years of hormone therapy: Heart and Estrogen/progestin Replacement Study follow-up (HERS II). MICHAEL 2002; 288:58.  Nadya GALLOWAY, Michelle DM, Corey KB, et al. Effects of estrogen replacement on the progression of coronary-artery atherosclerosis. N Engl J Med 2000; 343:522.  Luca KP, Rehan LK, Ankit AS, et al. Initiation of hormone replacement therapy after acute myocardial infarction is associated with more cardiac events during follow-up. J Am Kyler Cardiol 2001; 38:1.  Louie MELGAR,  Kinsey GU, Donell EUGENE, et al. Subgroup interactions in the Heart and Estrogen/Progestin Replacement Study: lessons learned. Circulation 2002; 105:917.  Grodstein F, Ridgway JE, Stampfer MJ. Postmenopausal hormone use and secondary prevention of coronary events in the nurses' health study. a prospective, observational study. [place] Intern Med 2001; 135:1.  Onel DD,  EL, Peter J, et al. Effects of hormone replacement therapy and antioxidant vitamin supplements on coronary atherosclerosis in postmenopausal women: a randomized controlled trial. MICHAEL 2002; 288:2432.  Evelia N, Antony K, Gloria P, et al. Oestrogen therapy for prevention of reinfarction in postmenopausal women: a randomised placebo controlled trial. Lancet 2002; 360:2001.  Dayami HN, Iain WJ, Antony SP, et al. Hormone therapy and the progression of coronary-artery atherosclerosis in postmenopausal women. N Engl J Med 2003; 349:535.  Edward PF, Reginald TD, Kinsey GU, et al. Effect of genetic variations in platelet glycoproteins Ibalpha and VI on the risk for coronary heart disease events in postmenopausal women taking hormone therapy. Blood 2007; 109:1862.  Ian PY, Jose Juan-Samantha M, u-[place] G, et al. Effects of oral and transdermal estrogen/progesterone regimens on blood coagulation and fibrinolysis in postmenopausal women. A randomized controlled trial. Arterioscler Thromb Vasc Biol 1997; 17:3071.  Branden SC, Ghada J, Chel H, et al. A study of hormone replacement therapy in postmenopausal women with ischaemic heart disease: the [place] HRT atherosclerosis study. BJOG 2002; 109:1056.  Amina FF, Donna JH, Houston TL, et al. Decreased risk of stroke among postmenopausal hormone users. Results from a national cohort. Arch Intern Med 1993; 153:73.  Agustin BG, Shmat MG, Go AS, et al. Hormone therapy and the risk of stroke after acute myocardial infarction in postmenopausal women. J Am Kyler Cardiol 2001; 38:1297.  Jose ELLINGTON,  Delia SR, Raeann BM, et al. Hormone replacement therapy and associated risk of stroke in postmenopausal women. Arch Intern Med 2002; 162:1954.  Pelican C, Malin LD, Jw IA, et al. Guidelines for the prevention of stroke in women: a statement for healthcare professionals from the American Heart Association/American Stroke Association. Stroke 2014; 45:1545.  Elliot BENOIT, Peter HUANG, Danilo JA, et al. Postmenopausal hormone therapy and risk of stroke: The Heart and Estrogen-progestin Replacement Study (HERS). Circulation 2001; 103:638.  Nelida CM, Ember LM, Scooter WN, et al. A clinical trial of estrogen-replacement therapy after ischemic stroke. N Engl J Med 2001; 345:1243.  Kulwinder PENNINGTON, John LEMOS, Jasmina M, et al. Effect of estrogen plus progestin on stroke in postmenopausal women: the Women's Health Initiative: a randomized trial. MICHAEL 2003; 289:2673.  John LEMOS, Kulwinder PENNINGTON, Erich NICK, et al. Effects of conjugated equine estrogen on stroke in the Women's Health Initiative. Circulation 2006; 113:2425.  Grosteffanietein F, [place] JE, Stampfer MJ, Pete K. Postmenopausal hormone therapy and stroke: role of time since menopause and age at initiation of hormone therapy. Arch Intern Med 2008; 168:861.  Bath PM, Jason LJ. Association between hormone replacement therapy and subsequent stroke: a meta-analysis. BMJ 2005; 330:342.  Renoux C, [place]'aniello S, Garbe E, Suissa S. Transdermal and oral hormone replacement therapy and the risk of stroke: a nested case-control study. BMJ 2010; 340:c2519.  Westshun IC, in't Denice BA, Marina DE, et al. Hormone replacement therapy and peripheral arterial disease: the [place] study. Arch Intern Med 2000; 160:2498.  Elliot Mckay, Debora F, et al. Peripheral arterial disease in randomized trial of estrogen with progestin in women with coronary heart disease: the Heart and Estrogen/Progestin Replacement Study. Circulation 2000; 102:2228.  Peter HUANG, Maurice BHAT,  Ishmael OSULLIVAN, et al. Estrogen plus progestin and the risk of peripheral arterial disease: the Women's Health Initiative. Circulation 2004; 109:620.  Nereyda H, Rafa LE, Kezia MW, et al. Risk of hospital admission for idiopathic venous thromboembolism among users of postmenopausal oestrogens. Lancet 1996; 348:981.  Nohelia E, Ina MP, Hurtado MM, et al. Risk of venous thromboembolism in users of hormone replacement therapy. Lancet 1996; 348:977.  Grosteffanietein F, Stampfer MJ, Nata SZ, et al. Prospective study of exogenous hormones and risk of pulmonary embolism in women. Lancet 1996; 348:983.  Sumner J, Ken BK, Jeancarlos HD. Postmenopausal estrogen replacement and risk for venous thromboembolism: a systematic review and meta-analysis for the [place]. Preventive Services Task Force. [place] Intern Med 2002; 136:680.  Eric Elena S, Ubaldo Edmonds LA, Tereza UHANG, Harsha Mustafa A. Hormone replacement therapy and risk of venous thromboembolism: population based case-control study. BMJ 1997; 314:796.  Shaneka E, Josefina E, Glendy H, et al. Increased risk of recurrent venous thromboembolism during hormone replacement therapy--results of the randomized, double-blind, placebo-controlled estrogen in venous thromboembolism trial (EVTET). Thromb Haemost 2000; 84:961.  Jojo CJ, Hamilton P, Aviva RN, et al. Effect of exogenous estrogen on atherothrombotic vascular disease risk related to the presence or absence of the factor V Leiden mutation (resistance to activated protein C). Am J Cardiol 1999; 84:549.  Flower LAUGHLIN, Ina EUGENE, Shayy A, et al. Hormonal replacement therapy, prothrombotic mutations and the risk of venous thrombosis. Br J Haematol 2002; 116:851.  Nadya DM, Kinsey E, Reginald TD, et al. Factor V Leiden, hormone replacement therapy, and risk of venous thromboembolic events in women with coronary disease. Arterioscler Thromb Vasc Biol 2002; 22:1012.  Marie M, Sal LH, Jayy R, et al. Estrogen plus  progestin and risk of venous thrombosis. MICHAEL 2004; 292:1573.  Giulia MEGAN, Jayy RL, Edward PF, et al. Venous thrombosis and conjugated equine estrogen in women without a uterus. Arch Intern Med 2006; 166:772.  Scott MS, Teresa J, Van Tiago Kenn MJ, et al. Increased resistance to activated protein C after short-term oral hormone replacement therapy in healthy post-menopausal women. Br J Haematol 2002; 119:1017.  Omid NL, Delia SR, Jose RN, et al. Esterified estrogens and conjugated equine estrogens and the risk of venous thrombosis. MICHAEL 2004; 292:1581.  El Hobbs, Jessica-Harris FREEMAN, et al. Hormone therapy and venous thromboembolism among postmenopausal women: impact of the route of estrogen administration and progestogens: the [place] study. Circulation 2007; 115:840.  Chris EUGENE, Carmen A, Deandra L, et al. Postmenopausal hormone therapy and risk of idiopathic venous thromboembolism: results from the N cohort study. Arterioscler Thromb Vasc Biol 2010; 30:340.  Chris EUGENE, Mcu-Harris FREEMAN, Yen GD, Ian PY. Hormone replacement therapy and risk of venous thromboembolism in postmenopausal women: systematic review and meta-analysis. BMJ 2008; 336:1227.  Geoffrey C, Elsa'Aniello S, Suissa S. Hormone replacement therapy and the risk of venous thromboembolism: a population-based study. J Thromb Haemost 2010; 8:979.  El Franklin, Edgar Granger, et al. Prothrombotic mutations, hormone therapy, and venous thromboembolism among postmenopausal women: impact of the route of estrogen administration. Circulation 2005; 112:3495.  Spencer MP, Deitcher SR. Risk of venous thromboembolic disease associated with hormonal contraceptives and hormone replacement therapy: a clinical review. Arch Intern Med 2004; 164:1965.  Arnulfo KK, Zacarias MS, Sakuma I, et al. Effects of conventional or lower doses of hormone replacement therapy in postmenopausal women. Arterioscler Thromb Vasc Biol 2004; 24:1516.  Jo S,  Mamta V, Reji A, et al. Venous thromboembolism risk in relation to use of different types of postmenopausal hormone therapy in a large prospective study. J Thromb Haemost 2012; 10:2277.  Jamshid BW, Frandy I, Johann B, et al. Effects of postmenopausal estrogen replacement on the concentrations and metabolism of plasma lipoproteins. N Engl J Med 1991; 325:1196.  Pat GM, Eugenio JA, Merna PI, Man SR. Estrogen and progestin compared with simvastatin for hypercholesterolemia in postmenopausal women. N Engl J Med 1997; 337:595.  Kevin EF, Garcia DB, Mikey KB, et al. Effects of hormone replacement therapy on serum lipids in elderly women. a randomized, placebo-controlled trial. [place] Intern Med 2001; 134:754.  Minna CJ, John HC, Cho DH, Min YK. Effects of hormone replacement therapy on lipoprotein(a) and lipids in postmenopausal women. Arterioscler Thromb 1994; 14:275.  Chip CH, Robe S, Jamilah L. Hormonal regulation of lipoprotein(a) levels: effects of estrogen replacement therapy on lipoprotein(a) and acute phase reactants in postmenopausal women. Arterioscler Thromb Vasc Biol 1997; 17:1822.  Orsherlyn SJ, Field CS, Randall RR, et al. Effects of long-term transdermal administration of estradiol on serum lipids. Land Clin Proc 1998; 73:735.  Nadya DM, Reginald TD, Bryant GA, et al. Estrogen-receptor polymorphisms and effects of estrogen replacement on high-density lipoprotein cholesterol in women with coronary disease. N Engl J Med 2002; 346:967.  Donell MH, Yohannes LM, Shaunna NICK, et al. A comparison of estrogen replacement, pravastatin, and combined treatment for the management of hypercholesterolemia in postmenopausal women. Arch Intern Med 1997; 157:1186.  Nika E, Matthias ZS, Doyle PALOMO . The effect of hormone replacement therapy alone and in combination with simvastatin on plasma lipids of hypercholesterolemic postmenopausal women with coronary artery disease. J Am Kyler Cardiol 1998;  32:1244.  Brea MS, Josh I, Saba G, et al. Fluctuations of lipid and lipoprotein levels in hyperlipidemic postmenopausal women receiving hormone replacement therapy. Arch Intern Med 1998; 158:1803.  Summary of the second report of the National Cholesterol Education Program (NCEP) Expert Panel on Detection, Evaluation, and Treatment of High Blood Cholesterol in Adults (Adult Treatment Panel II). MICHAEL 1993; 269:3015.  National Cholesterol Education Program (NCEP) Expert Panel on Detection, Evaluation, and Treatment of High Blood Cholesterol in Adults (Adult Treatment Panel III). Third Report of the National Cholesterol Education Program (NCEP) Expert Panel on Detection, Evaluation, and Treatment of High Blood Cholesterol in Adults (Adult Treatment Panel III) final report. Circulation 2002; 106:3143.  Walker UB, Dennis BG, von Gricel B. Subfractions of high-density lipoprotein cholesterol during estrogen replacement therapy: a comparison between progestogens and natural progesterone. Am J Obstet Gynecol 1985; 151:746.  Effects of estrogen or estrogen/progestin regimens on heart disease risk factors in postmenopausal women. The Postmenopausal Estrogen/Progestin Interventions (PEPI) Trial. The Writing Group for the PEPI Trial. MICHAEL 1995; 273:199.  Randomised comparison of oestrogen versus oestrogen plus progestogen hormone replacement therapy in women with hysterectomy. Medical Research [place]'s General Practice Research Framework. BMJ 1996; 312:473.  Donell MH, Shaunna NICK, Harrison P, et al. Effects of continuous estrogen and estrogen-progestin replacement regimens on cardiovascular risk markers in postmenopausal women. Arch Intern Med 2000; 160:3315.  Terrie S, Bhavya A, Albert F, et al. Changes in plasma lipoprotein and apolipoprotein composition in relation to oral versus percutaneous administration of estrogen alone or in cyclic association with utrogestan in menopausal women. J Clin Endocrinol  Metab 1991; 73:373.  Omar MAYO, Magno SM, Burak V, et al. Effect of postmenopausal hormone therapy on lipoprotein(a) concentration. PEPI Investigators. Postmenopausal Estrogen/Progestin Interventions. Circulation 1998; 97:979.  Nicola IF. Effects of postmenopausal hormone replacement therapy on lipid, lipoprotein, and apolipoprotein (a) concentrations: analysis of studies published from 0918-1851. Fertil Steril 2001; 75:898.  Juliane-Jose D D, Arley-Serafin E. Long-term postmenopausal hormone use, obesity, and fat distribution in older women. MICHAEL 1996; 275:46.  Roy LB, Jose Roberto P, Dudley AP, et al. Hormone replacement therapy dissociates fat mass and bone mass, and tends to reduce weight gain in early postmenopausal women: a randomized controlled 5-year clinical trial of the [place] Osteoporosis Prevention Study. J Bone Sabine Res 2003; 18:333.  Zayda HUANG, Martínez U, Kesha A, Mary C. Postmenopausal hormone replacement therapy prevents central distribution of body fat after menopause. Metabolism 1991; 40:1323.

## 2023-06-07 NOTE — PROGRESS NOTES
Chief Complaint   Patient presents with    Other     F/V Dx: Family history of heart disease       Gilbert Rivas is a 68 y.o. female who presents today  in consultation from Vikram Phelan M.D. for evaluation of a strong family history of early coronary artery disease she has previously seen cardiology in [place] reports likely dyslipidemia possibly hypertriglyceridemia and she had been on statin therapy in the past.  She moved to [place] couple years ago and is very active with horses.  She has found significant benefit and bioidentical hormone replacement therapy      Past Medical History:   Diagnosis Date    Asthma     Thyroid disease      Past Surgical History:   Procedure Laterality Date    PB TOTAL KNEE ARTHROPLASTY Right 10/18/2021    Procedure: RIGHT TOTAL KNEE ARTHROPLASTY;  Surgeon: Joel Salinas M.D.;  Location: [facility] [place];  Service: Orthopedics    KNEE ARTHROSCOPY Right 2020    Procedure: RT KNEE ARTHROSCOPY POSSIBLE ARTHROTOMY PARTIAL MEDIAL MENISCECTMY AND CHONDROPLASTY;  Surgeon: Marques Bhakta M.D.;  Location: SURGERY [facility];  Service: Orthopedics    APPENDECTOMY      ARTHROPLASTY      LUMPECTOMY      OTHER      face lift    OTHER ABDOMINAL SURGERY      appendectomy     Family History   Problem Relation Age of Onset    Alcohol abuse Mother     Heart Disease Father          of massive heart attack age 40    Heart Disease Brother         Younger brother.  of congestive heart failure    Hypertension Brother     Hyperlipidemia Brother     Drug abuse Brother     Hyperlipidemia Paternal Aunt     Hyperlipidemia Paternal Uncle      Social History     Socioeconomic History    Marital status:      Spouse name: Not on file    Number of children: Not on file    Years of education: Not on file    Highest education level: Master's degree (e.g., MA, MS, Christian, MEd, MSW, LUIS)   Occupational History    Not on file   Tobacco Use    Smoking status:  Never    Smokeless tobacco: Never   Vaping Use    Vaping Use: Never used   Substance and Sexual Activity    Alcohol use: Yes     Alcohol/week: 1.2 - 1.8 oz     Types: 2 - 3 Standard drinks or equivalent per week    Drug use: Never    Sexual activity: Yes     Partners: Male     Birth control/protection: Post-Menopausal   Other Topics Concern    Not on file   Social History Narrative    Retired  at school     Social Determinants of Health     Financial Resource Strain: Low Risk  (2/24/2023)    Overall Financial Resource Strain (CARDIA)     Difficulty of Paying Living Expenses: Not very hard   Food Insecurity: No Food Insecurity (2/24/2023)    Hunger Vital Sign     Worried About Running Out of Food in the Last Year: Never true     Ran Out of Food in the Last Year: Never true   Transportation Needs: No Transportation Needs (2/24/2023)    PRAPARE - Transportation     Lack of Transportation (Medical): No     Lack of Transportation (Non-Medical): No   Physical Activity: Sufficiently Active (2/24/2023)    Exercise Vital Sign     Days of Exercise per Week: 5 days     Minutes of Exercise per Session: 30 min   Stress: No Stress Concern Present (2/24/2023)    [place] [employer] - Occupational Stress Questionnaire     Feeling of Stress : Not at all   Social Connections: Moderately Integrated (2/24/2023)    Social Connection and Isolation Panel [NHANES]     Frequency of Communication with Friends and Family: More than three times a week     Frequency of Social Gatherings with Friends and Family: Once a week     Attends Tenriism Services: Never     Active Member of Clubs or Organizations: Yes     Attends Club or Organization Meetings: More than 4 times per year     Marital Status:    Intimate Partner Violence: Not on file   Housing Stability: Unknown (2/24/2023)    Housing Stability Vital Sign     Unable to Pay for Housing in the Last Year: No     Number of Places Lived in the Last Year:  "Not on file     Unstable Housing in the Last Year: No     Allergies   Allergen Reactions    Oxycodone Rash and Vomiting     Rash all over/vomiting      Outpatient Encounter Medications as of 6/7/2023   Medication Sig Dispense Refill    progesterone (PROMETRIUM) 100 MG Cap Take 100 mg by mouth at bedtime.      betamethasone dipropionate 0.05 % Cream       fenofibrate micronized (LOFIBRA) 200 MG capsule Take 200 mg by mouth every day.      citalopram (CELEXA) 10 MG tablet       albuterol 108 (90 Base) MCG/ACT Aero Soln inhalation aerosol       fluticasone (FLONASE) 50 MCG/ACT nasal spray Spray 1 Spray in nose 2 times a day.      levothyroxine (SYNTHROID) 50 MCG Tab Take 50 mcg by mouth Every morning on an empty stomach.      progesterone (PROMETRIUM) 100 MG Cap Take 100 mg by mouth every day. (Patient not taking: Reported on 6/7/2023)      fenofibrate (TRICOR) 145 MG Tab Take 145 mg by mouth every day. (Patient not taking: Reported on 6/7/2023)      Omega-3 Fatty Acids (OMEGA-3 FISH OIL) 1200 MG Cap Take 2 tablet by mouth 2 Times a Day. (Patient not taking: Reported on 2/27/2023)       No facility-administered encounter medications on file as of 6/7/2023.     Review of Systems   Constitutional:  Negative for chills and fever.   HENT:  Positive for hearing loss. Negative for sore throat.    Eyes:  Negative for blurred vision.   Respiratory:  Negative for cough and shortness of breath.    Cardiovascular:  Negative for chest pain, palpitations, claudication, leg swelling and PND.   Gastrointestinal:  Negative for abdominal pain and nausea.   Musculoskeletal:  Positive for joint pain. Negative for falls.   Skin:  Negative for rash.   Neurological:  Negative for dizziness, focal weakness and weakness.   Endo/Heme/Allergies:  Does not bruise/bleed easily.              Objective     /82 (BP Location: Left arm, Patient Position: Sitting, BP Cuff Size: Adult)   Pulse 82   Resp 16   Ht 1.702 m (5' 7\")   Wt 83 kg (183 " lb)   SpO2 97%   BMI 28.66 kg/m²     Physical Exam  Constitutional:       General: She is not in acute distress.     Appearance: She is not diaphoretic.   Eyes:      General: No scleral icterus.  Neck:      Vascular: No JVD.   Cardiovascular:      Rate and Rhythm: Normal rate.      Heart sounds: Normal heart sounds. No murmur heard.     No friction rub. No gallop.   Pulmonary:      Effort: No respiratory distress.      Breath sounds: No wheezing or rales.   Abdominal:      General: Bowel sounds are normal.      Palpations: Abdomen is soft.   Musculoskeletal:      Right lower leg: No edema.      Left lower leg: No edema.   Skin:     Findings: No rash.   Neurological:      Mental Status: She is alert. Mental status is at baseline.   Psychiatric:         Mood and Affect: Mood normal.          We reviewed in person the most recent labs  Recent Results (from the past 5040 hour(s))   EKG    Collection Time: 23  4:06 PM   Result Value Ref Range    Report       [facility]    Test Date:  2023  Pt Name:    JR ALVAREZ          Department: [facility]  MRN:        [id number]                      Room:  Gender:     Female                       Technician: MS  :        [id number]                   Requested By:TREY SWANSON  Order #:    [id number]                    Reading MD:    Measurements  Intervals                                Axis  Rate:       80                           P:          66  WI:         151                          QRS:        6  QRSD:       99                           T:          58  QT:         367  QTc:        424    Interpretive Statements  Sinus rhythm  Low voltage, precordial leads  No previous ECG available for comparison             Assessment & Plan     1. Other chest pain  EKG      2. Family history of heart disease  Lipid Profile    Lipoprotein (a)    CRP HIGH SENSITIVE (CARDIAC)    CT-CARDIAC SCORING    Referral to Genetic Research Studies      3.  Encounter for screening for cardiovascular disorders  CRP HIGH SENSITIVE (CARDIAC)          Medical Decision Making: Today's Assessment/Status/Plan:          It was my pleasure to meet with Ms. Rivas.    Blood pressure is well controlled.  She will continue to monitor and eat hearty healthy diet.      Will check labs off fenofibrate    We will do a benefit of cardiology screening with calcium score and lab work, cardiac genetics for FH through the [facility] project    We will follow up with Ms. Rivas on the results of the testing over the phone. We will determine further follow-up from there.      I will see Ms. Rivas back in 1-2 year time and encouraged her to follow up with us over the phone or electronically using my [employer] as issues arise.    It is my pleasure to participate in the care of Ms. Rivas.  Please do not hesitate to contact me with questions or concerns.    Rolly Harper MD PhD [facility]  Cardiologist [facility]    Please note that this dictation was created using voice recognition software. There may be errors I did not discover before finalizing the note.

## 2023-06-27 ENCOUNTER — HOSPITAL ENCOUNTER (OUTPATIENT)
Dept: LAB | Facility: MEDICAL CENTER | Age: 68
End: 2023-06-27
Attending: INTERNAL MEDICINE
Payer: MEDICARE

## 2023-06-27 ENCOUNTER — HOSPITAL ENCOUNTER (OUTPATIENT)
Dept: RADIOLOGY | Facility: MEDICAL CENTER | Age: 68
End: 2023-06-27
Attending: INTERNAL MEDICINE
Payer: COMMERCIAL

## 2023-06-27 DIAGNOSIS — Z82.49 FAMILY HISTORY OF HEART DISEASE: ICD-10-CM

## 2023-06-27 DIAGNOSIS — Z13.6 ENCOUNTER FOR SCREENING FOR CARDIOVASCULAR DISORDERS: ICD-10-CM

## 2023-06-27 LAB
CHOLEST SERPL-MCNC: 240 MG/DL (ref 100–199)
CRP SERPL HS-MCNC: 0.5 MG/L (ref 0–3)
FASTING STATUS PATIENT QL REPORTED: NORMAL
HDLC SERPL-MCNC: 58 MG/DL
LDLC SERPL CALC-MCNC: 162 MG/DL
TRIGL SERPL-MCNC: 102 MG/DL (ref 0–149)

## 2023-06-27 PROCEDURE — 83695 ASSAY OF LIPOPROTEIN(A): CPT

## 2023-06-27 PROCEDURE — 86141 C-REACTIVE PROTEIN HS: CPT

## 2023-06-27 PROCEDURE — 36415 COLL VENOUS BLD VENIPUNCTURE: CPT

## 2023-06-27 PROCEDURE — 80061 LIPID PANEL: CPT

## 2023-06-27 PROCEDURE — 4410556 CT-CARDIAC SCORING (SELF PAY ONLY)

## 2023-06-28 LAB — LPA SERPL-MCNC: <6 MG/DL

## 2023-06-29 LAB
APOB+LDLR+PCSK9 GENE MUT ANL BLD/T: NOT DETECTED
BRCA1+BRCA2 DEL+DUP + FULL MUT ANL BLD/T: NOT DETECTED
MLH1+MSH2+MSH6+PMS2 GN DEL+DUP+FUL M: NOT DETECTED

## 2023-07-04 ENCOUNTER — TELEPHONE (OUTPATIENT)
Dept: CARDIOLOGY | Facility: MEDICAL CENTER | Age: 68
End: 2023-07-04
Payer: MEDICARE

## 2023-07-04 ENCOUNTER — PATIENT MESSAGE (OUTPATIENT)
Dept: CARDIOLOGY | Facility: MEDICAL CENTER | Age: 68
End: 2023-07-04
Payer: MEDICARE

## 2023-07-04 DIAGNOSIS — Z82.49 FAMILY HISTORY OF HEART DISEASE: ICD-10-CM

## 2023-07-06 RX ORDER — ROSUVASTATIN CALCIUM 5 MG/1
5 TABLET, COATED ORAL EVERY EVENING
Qty: 90 TABLET | Refills: 3 | Status: SHIPPED | OUTPATIENT
Start: 2023-07-06 | End: 2024-02-26 | Stop reason: SDUPTHER

## 2023-07-06 NOTE — TELEPHONE ENCOUNTER
To: CW    Patient is asking about Bempodoic Acid as an alternative to a statin. She is also requesting to have lowest dose of statin if possible. Please advise. Thank you

## 2023-07-18 ENCOUNTER — APPOINTMENT (OUTPATIENT)
Dept: CARDIOLOGY | Facility: MEDICAL CENTER | Age: 68
End: 2023-07-18
Attending: FAMILY MEDICINE
Payer: MEDICARE

## 2023-09-05 ENCOUNTER — TELEPHONE (OUTPATIENT)
Dept: HEALTH INFORMATION MANAGEMENT | Facility: OTHER | Age: 68
End: 2023-09-05
Payer: MEDICARE

## 2024-02-26 ENCOUNTER — OFFICE VISIT (OUTPATIENT)
Dept: MEDICAL GROUP | Facility: PHYSICIAN GROUP | Age: 69
End: 2024-02-26
Payer: MEDICARE

## 2024-02-26 VITALS
DIASTOLIC BLOOD PRESSURE: 76 MMHG | HEIGHT: 66 IN | HEART RATE: 82 BPM | TEMPERATURE: 98.6 F | OXYGEN SATURATION: 100 % | SYSTOLIC BLOOD PRESSURE: 112 MMHG | BODY MASS INDEX: 26.2 KG/M2 | RESPIRATION RATE: 18 BRPM | WEIGHT: 163 LBS

## 2024-02-26 DIAGNOSIS — S69.92XD INJURY OF LEFT MIDDLE FINGER, SUBSEQUENT ENCOUNTER: ICD-10-CM

## 2024-02-26 DIAGNOSIS — Z12.11 SCREENING FOR COLORECTAL CANCER: ICD-10-CM

## 2024-02-26 DIAGNOSIS — Z82.49 FAMILY HISTORY OF HEART DISEASE: ICD-10-CM

## 2024-02-26 DIAGNOSIS — Z12.12 SCREENING FOR COLORECTAL CANCER: ICD-10-CM

## 2024-02-26 DIAGNOSIS — R19.5 POSITIVE FIT (FECAL IMMUNOCHEMICAL TEST): ICD-10-CM

## 2024-02-26 DIAGNOSIS — M70.72 BURSITIS OF LEFT HIP, UNSPECIFIED BURSA: ICD-10-CM

## 2024-02-26 PROCEDURE — 3078F DIAST BP <80 MM HG: CPT | Performed by: FAMILY MEDICINE

## 2024-02-26 PROCEDURE — 99214 OFFICE O/P EST MOD 30 MIN: CPT | Performed by: FAMILY MEDICINE

## 2024-02-26 PROCEDURE — 3074F SYST BP LT 130 MM HG: CPT | Performed by: FAMILY MEDICINE

## 2024-02-26 RX ORDER — BETAMETHASONE DIPROPIONATE 0.5 MG/G
CREAM TOPICAL
Status: CANCELLED | OUTPATIENT
Start: 2024-02-26

## 2024-02-26 RX ORDER — CITALOPRAM HYDROBROMIDE 10 MG/1
10 TABLET ORAL DAILY
Qty: 30 TABLET | Status: CANCELLED | OUTPATIENT
Start: 2024-02-26

## 2024-02-26 RX ORDER — ROSUVASTATIN CALCIUM 5 MG/1
5 TABLET, COATED ORAL EVERY EVENING
Qty: 90 TABLET | Refills: 3 | Status: SHIPPED | OUTPATIENT
Start: 2024-02-26

## 2024-02-26 RX ORDER — MELOXICAM 15 MG/1
15 TABLET ORAL DAILY
Qty: 30 TABLET | Refills: 2 | Status: SHIPPED | OUTPATIENT
Start: 2024-02-26 | End: 2024-05-26

## 2024-02-26 RX ORDER — FLUTICASONE PROPIONATE 50 MCG
1 SPRAY, SUSPENSION (ML) NASAL 2 TIMES DAILY
Qty: 16 G | Refills: 2 | Status: SHIPPED | OUTPATIENT
Start: 2024-02-26

## 2024-02-26 RX ORDER — ROSUVASTATIN CALCIUM 5 MG/1
5 TABLET, COATED ORAL EVERY EVENING
Qty: 90 TABLET | Refills: 3 | Status: CANCELLED | OUTPATIENT
Start: 2024-02-26

## 2024-02-26 RX ORDER — FOLIC ACID/MULTIVIT,IRON,MINER 0.4MG-18MG
2 TABLET ORAL
Qty: 60 CAPSULE | Refills: 1 | Status: SHIPPED | OUTPATIENT
Start: 2024-02-26

## 2024-02-26 RX ORDER — CITALOPRAM HYDROBROMIDE 10 MG/1
10 TABLET ORAL DAILY
Qty: 30 TABLET | Refills: 2 | Status: SHIPPED | OUTPATIENT
Start: 2024-02-26

## 2024-02-26 RX ORDER — BETAMETHASONE DIPROPIONATE 0.5 MG/G
CREAM TOPICAL
Qty: 45 G | Refills: 2 | Status: SHIPPED | OUTPATIENT
Start: 2024-02-26

## 2024-02-26 RX ORDER — FOLIC ACID/MULTIVIT,IRON,MINER 0.4MG-18MG
2 TABLET ORAL
Status: CANCELLED | OUTPATIENT
Start: 2024-02-26

## 2024-02-26 RX ORDER — LEVOTHYROXINE SODIUM 0.05 MG/1
50 TABLET ORAL
Qty: 30 TABLET | Refills: 1 | Status: SHIPPED | OUTPATIENT
Start: 2024-02-26

## 2024-02-26 RX ORDER — LEVOTHYROXINE SODIUM 0.05 MG/1
50 TABLET ORAL
Qty: 30 TABLET | Status: CANCELLED | OUTPATIENT
Start: 2024-02-26

## 2024-02-26 RX ORDER — FLUTICASONE PROPIONATE 50 MCG
1 SPRAY, SUSPENSION (ML) NASAL 2 TIMES DAILY
Qty: 16 G | Status: CANCELLED | OUTPATIENT
Start: 2024-02-26

## 2024-02-26 ASSESSMENT — ENCOUNTER SYMPTOMS
EYES NEGATIVE: 1
CARDIOVASCULAR NEGATIVE: 1
NAUSEA: 0
DIZZINESS: 0
BRUISES/BLEEDS EASILY: 0
HEMOPTYSIS: 0
HEADACHES: 0
FEVER: 0
CHILLS: 0
MYALGIAS: 0
HEARTBURN: 0
TINGLING: 0
GASTROINTESTINAL NEGATIVE: 1
PSYCHIATRIC NEGATIVE: 1
DOUBLE VISION: 0
COUGH: 0
DEPRESSION: 0
BLURRED VISION: 0
PALPITATIONS: 0
CONSTITUTIONAL NEGATIVE: 1
NEUROLOGICAL NEGATIVE: 1
RESPIRATORY NEGATIVE: 1

## 2024-02-26 ASSESSMENT — PATIENT HEALTH QUESTIONNAIRE - PHQ9: CLINICAL INTERPRETATION OF PHQ2 SCORE: 0

## 2024-02-26 NOTE — PROGRESS NOTES
Subjective     Ro Juanita Rivas is a 68 y.o. female who presents with Medication Refill            1. Family history of heart disease     rosuvastatin (CRESTOR) 5 MG Tab; Take 1 Tablet by mouth every evening.  Dispense: 90 Tablet; Refill: 3    2. Bursitis of left hip, unspecified bursa  Fell off horse and still with somepain   meloxicam (MOBIC) 15 MG tablet; Take 1 Tablet by mouth every day for 90 days.  Dispense: 30 Tablet; Refill: 2    3. Injury of left middle finger, subsequent encounter  Seen by IVANIA, doing exercises    Past Medical History:  No date: Asthma  No date: Thyroid disease  Past Surgical History:  10/18/2021: PB TOTAL KNEE ARTHROPLASTY; Right      Comment:  Procedure: RIGHT TOTAL KNEE ARTHROPLASTY;  Surgeon:                Joel Salinas M.D.;  Location: [facility]               [place];  Service: Orthopedics  2020: KNEE ARTHROSCOPY; Right      Comment:  Procedure: RT KNEE ARTHROSCOPY POSSIBLE ARTHROTOMY                PARTIAL MEDIAL MENISCECTMY AND CHONDROPLASTY;  Surgeon:                Marques Bhakta M.D.;  Location: SURGERY [facility];  Service: Orthopedics  No date: APPENDECTOMY  No date: ARTHROPLASTY  No date: LUMPECTOMY  No date: OTHER      Comment:  face lift  No date: OTHER ABDOMINAL SURGERY      Comment:  appendectomy  Social History    Tobacco Use      Smoking status: Never      Smokeless tobacco: Never    Vaping Use      Vaping Use: Never used    Alcohol use: Yes      Alcohol/week: 1.2 - 1.8 oz      Types: 2 - 3 Standard drinks or equivalent per week    Drug use: Never    Review of patient's family history indicates:  Problem: Alcohol abuse      Relation: Mother          Age of Onset: (Not Specified)  Problem: Heart Disease      Relation: Father          Age of Onset: (Not Specified)          Comment:  of massive heart attack age 40  Problem: Heart Disease      Relation: Brother          Age of Onset: (Not Specified)          Comment: Younger  brother.  of congestive heart failure  Problem: Hypertension      Relation: Brother          Age of Onset: (Not Specified)  Problem: Hyperlipidemia      Relation: Brother          Age of Onset: (Not Specified)  Problem: Drug abuse      Relation: Brother          Age of Onset: (Not Specified)  Problem: Hyperlipidemia      Relation: Paternal Aunt          Age of Onset: (Not Specified)  Problem: Hyperlipidemia      Relation: Paternal Uncle          Age of Onset: (Not Specified)      Current Outpatient Medications: ·  levothyroxine (SYNTHROID) 50 MCG Tab, Take 1 Tablet by mouth every morning on an empty stomach., Disp: 30 Tablet, Rfl: 1·  rosuvastatin (CRESTOR) 5 MG Tab, Take 1 Tablet by mouth every evening., Disp: 90 Tablet, Rfl: 3·  Omega-3 Fatty Acids (OMEGA-3 FISH OIL) 1200 MG Cap, Take 2 tablet  by mouth 2 times a day., Disp: 60 Capsule, Rfl: 1·  fluticasone (FLONASE) 50 MCG/ACT nasal spray, Administer 1 Spray into affected nostril(S) 2 times a day., Disp: 16 g, Rfl: 2·  citalopram (CELEXA) 10 MG tablet, Take 1 Tablet by mouth every day., Disp: 30 Tablet, Rfl: 2·  betamethasone dipropionate 0.05 % Cream, 1 gm prn, Disp: 45 g, Rfl: 2·  meloxicam (MOBIC) 15 MG tablet, Take 1 Tablet by mouth every day for 90 days., Disp: 30 Tablet, Rfl: 2·  estradiol (ESTRACE) 0.1 MG/GM vaginal cream, Insert 2 g into the vagina., Disp: , Rfl: ·  progesterone (PROMETRIUM) 100 MG Cap, Take 100 mg by mouth at bedtime., Disp: , Rfl: ·  fenofibrate micronized (LOFIBRA) 200 MG capsule, Take 200 mg by mouth every day., Disp: , Rfl: ·  albuterol 108 (90 Base) MCG/ACT Aero Soln inhalation aerosol, , Disp: , Rfl:  ·  HYDROcodone-acetaminophen (NORCO) 5-325 MG Tab per tablet, TAKE 1 TABLET BY MOUTH EVERY 6 HOURS AS NEEDED FOR PAIN FOR UP TO 2 DAYS. (Patient not taking: Reported on 2024), Disp: , Rfl:     Patient was instructed on the use of medications, either prescriptions or OTC and informed on when the appropriate follow up time  "period should be. In addition, patient was also instructed that should any acute worsening occur that they should notify this clinic asap or call 911.              Review of Systems   Constitutional: Negative.  Negative for chills and fever.   HENT: Negative.  Negative for hearing loss.    Eyes: Negative.  Negative for blurred vision and double vision.   Respiratory: Negative.  Negative for cough and hemoptysis.    Cardiovascular: Negative.  Negative for chest pain and palpitations.   Gastrointestinal: Negative.  Negative for heartburn and nausea.   Genitourinary: Negative.  Negative for dysuria.   Musculoskeletal:  Positive for joint pain. Negative for myalgias.   Skin: Negative.  Negative for rash.   Neurological: Negative.  Negative for dizziness, tingling and headaches.   Endo/Heme/Allergies: Negative.  Does not bruise/bleed easily.   Psychiatric/Behavioral: Negative.  Negative for depression and suicidal ideas.    All other systems reviewed and are negative.             Objective     /76 (BP Location: Left arm, Patient Position: Sitting)   Pulse 82   Temp 37 °C (98.6 °F) (Temporal)   Resp 18   Ht 1.676 m (5' 6\")   Wt 73.9 kg (163 lb)   SpO2 100%   BMI 26.31 kg/m²      Physical Exam  Vitals and nursing note reviewed.   Constitutional:       General: She is not in acute distress.     Appearance: She is well-developed. She is not diaphoretic.   HENT:      Head: Normocephalic and atraumatic.      Mouth/Throat:      Pharynx: No oropharyngeal exudate.   Eyes:      Pupils: Pupils are equal, round, and reactive to light.   Cardiovascular:      Rate and Rhythm: Normal rate and regular rhythm.      Heart sounds: Normal heart sounds. No murmur heard.     No friction rub. No gallop.   Pulmonary:      Effort: Pulmonary effort is normal. No respiratory distress.      Breath sounds: Normal breath sounds. No wheezing or rales.   Chest:      Chest wall: No tenderness.   Musculoskeletal:      Left hand: Decreased " range of motion.   Neurological:      Mental Status: She is alert and oriented to person, place, and time.   Psychiatric:         Behavior: Behavior normal.         Thought Content: Thought content normal.         Judgment: Judgment normal.                             Assessment & Plan        1. Family history of heart disease    - rosuvastatin (CRESTOR) 5 MG Tab; Take 1 Tablet by mouth every evening.  Dispense: 90 Tablet; Refill: 3    2. Bursitis of left hip, unspecified bursa    - meloxicam (MOBIC) 15 MG tablet; Take 1 Tablet by mouth every day for 90 days.  Dispense: 30 Tablet; Refill: 2    3. Injury of left middle finger, subsequent encounter

## 2024-03-01 ENCOUNTER — HOSPITAL ENCOUNTER (OUTPATIENT)
Facility: MEDICAL CENTER | Age: 69
End: 2024-03-01
Attending: FAMILY MEDICINE
Payer: MEDICARE

## 2024-03-01 PROCEDURE — 82274 ASSAY TEST FOR BLOOD FECAL: CPT

## 2024-03-04 DIAGNOSIS — Z12.12 SCREENING FOR COLORECTAL CANCER: ICD-10-CM

## 2024-03-04 DIAGNOSIS — Z12.11 SCREENING FOR COLORECTAL CANCER: ICD-10-CM

## 2024-03-07 LAB — IMM ASSAY OCC BLD FITOB: POSITIVE

## 2024-03-11 ENCOUNTER — APPOINTMENT (RX ONLY)
Dept: URBAN - METROPOLITAN AREA CLINIC 31 | Facility: CLINIC | Age: 69
Setting detail: DERMATOLOGY
End: 2024-03-11

## 2024-03-11 DIAGNOSIS — L29.8 OTHER PRURITUS: ICD-10-CM

## 2024-03-11 DIAGNOSIS — L81.4 OTHER MELANIN HYPERPIGMENTATION: ICD-10-CM

## 2024-03-11 DIAGNOSIS — L82.1 OTHER SEBORRHEIC KERATOSIS: ICD-10-CM

## 2024-03-11 DIAGNOSIS — D22 MELANOCYTIC NEVI: ICD-10-CM | Status: STABLE

## 2024-03-11 DIAGNOSIS — L28.1 PRURIGO NODULARIS: ICD-10-CM

## 2024-03-11 DIAGNOSIS — L29.89 OTHER PRURITUS: ICD-10-CM

## 2024-03-11 DIAGNOSIS — L20.89 OTHER ATOPIC DERMATITIS: ICD-10-CM | Status: STABLE

## 2024-03-11 PROBLEM — D22.5 MELANOCYTIC NEVI OF TRUNK: Status: ACTIVE | Noted: 2024-03-11

## 2024-03-11 PROCEDURE — 99213 OFFICE O/P EST LOW 20 MIN: CPT

## 2024-03-11 PROCEDURE — ? COUNSELING: TOPICAL STEROIDS

## 2024-03-11 PROCEDURE — ? PRESCRIPTION

## 2024-03-11 PROCEDURE — ? DIAGNOSIS COMMENT

## 2024-03-11 PROCEDURE — ? TREATMENT REGIMEN

## 2024-03-11 PROCEDURE — ? COUNSELING

## 2024-03-11 RX ORDER — BETAMETHASONE DIPROPIONATE 0.5 MG/G
CREAM TOPICAL BID
Qty: 45 | Refills: 1 | Status: ERX

## 2024-03-11 ASSESSMENT — LOCATION SIMPLE DESCRIPTION DERM
LOCATION SIMPLE: CHEST
LOCATION SIMPLE: RIGHT POSTERIOR THIGH
LOCATION SIMPLE: RIGHT UPPER BACK
LOCATION SIMPLE: UPPER BACK
LOCATION SIMPLE: RIGHT HAND
LOCATION SIMPLE: NOSE
LOCATION SIMPLE: LEFT POSTERIOR THIGH
LOCATION SIMPLE: LEFT UPPER BACK
LOCATION SIMPLE: RIGHT FOREARM

## 2024-03-11 ASSESSMENT — LOCATION DETAILED DESCRIPTION DERM
LOCATION DETAILED: LEFT SUPERIOR MEDIAL UPPER BACK
LOCATION DETAILED: RIGHT PROXIMAL POSTERIOR THIGH
LOCATION DETAILED: RIGHT RADIAL DORSAL HAND
LOCATION DETAILED: LEFT DISTAL POSTERIOR THIGH
LOCATION DETAILED: INFERIOR THORACIC SPINE
LOCATION DETAILED: NASAL ROOT
LOCATION DETAILED: RIGHT MID-UPPER BACK
LOCATION DETAILED: RIGHT VENTRAL PROXIMAL FOREARM
LOCATION DETAILED: LEFT MEDIAL SUPERIOR CHEST

## 2024-03-11 ASSESSMENT — LOCATION ZONE DERM
LOCATION ZONE: TRUNK
LOCATION ZONE: ARM
LOCATION ZONE: NOSE
LOCATION ZONE: LEG
LOCATION ZONE: HAND

## 2024-03-11 NOTE — PROCEDURE: TREATMENT REGIMEN
Detail Level: Simple
Otc Regimen: The patient may apply otc HCC daily as directed prn itch\\nShe may also apply aquaphor daily if she desires
Otc Regimen: The patient may apply Amlactin to the AA daily prn itching\\nShe may also apply aquaphor daily if she desires
Initiate Treatment: Betamethasone daily until lesions resolve.

## 2025-01-07 RX ORDER — CITALOPRAM HYDROBROMIDE 10 MG/1
10 TABLET ORAL DAILY
Qty: 90 TABLET | Refills: 1 | Status: SHIPPED | OUTPATIENT
Start: 2025-01-07

## 2025-01-07 NOTE — TELEPHONE ENCOUNTER
Received request via: Pharmacy    Was the patient seen in the last year in this department? Yes    Does the patient have an active prescription (recently filled or refills available) for medication(s) requested? No    [facility]     Does the patient have [facility] Plus and need 100-day supply? (This applies to ALL medications) Patient does not have SCP

## 2025-01-29 DIAGNOSIS — Z78.0 POST-MENOPAUSAL: ICD-10-CM

## 2025-01-29 NOTE — TELEPHONE ENCOUNTER
Received request via: Pharmacy    Was the patient seen in the last year in this department? Yes    Does the patient have an active prescription (recently filled or refills available) for medication(s) requested? No    [facility]     Does the patient have longterm Plus and need 100-day supply? (This applies to ALL medications) Patient does not have SCP

## 2025-01-30 RX ORDER — FLUTICASONE PROPIONATE 50 MCG
1 SPRAY, SUSPENSION (ML) NASAL 2 TIMES DAILY
Qty: 48 ML | Refills: 0 | Status: SHIPPED | OUTPATIENT
Start: 2025-01-30

## 2025-03-10 ENCOUNTER — APPOINTMENT (OUTPATIENT)
Dept: URBAN - METROPOLITAN AREA CLINIC 31 | Facility: CLINIC | Age: 70
Setting detail: DERMATOLOGY
End: 2025-03-10

## 2025-03-10 DIAGNOSIS — L82.1 OTHER SEBORRHEIC KERATOSIS: ICD-10-CM

## 2025-03-10 DIAGNOSIS — L28.1 PRURIGO NODULARIS: ICD-10-CM

## 2025-03-10 DIAGNOSIS — D22 MELANOCYTIC NEVI: ICD-10-CM | Status: STABLE

## 2025-03-10 DIAGNOSIS — L20.89 OTHER ATOPIC DERMATITIS: ICD-10-CM

## 2025-03-10 DIAGNOSIS — L81.4 OTHER MELANIN HYPERPIGMENTATION: ICD-10-CM

## 2025-03-10 PROBLEM — D22.5 MELANOCYTIC NEVI OF TRUNK: Status: ACTIVE | Noted: 2025-03-10

## 2025-03-10 PROCEDURE — 99213 OFFICE O/P EST LOW 20 MIN: CPT

## 2025-03-10 PROCEDURE — ? DIAGNOSIS COMMENT

## 2025-03-10 PROCEDURE — ? COUNSELING

## 2025-03-10 PROCEDURE — ? TREATMENT REGIMEN

## 2025-03-10 PROCEDURE — ? ADDITIONAL NOTES

## 2025-03-10 PROCEDURE — ? PRESCRIPTION

## 2025-03-10 RX ORDER — BETAMETHASONE DIPROPIONATE 0.5 MG/G
CREAM TOPICAL BID
Qty: 45 | Refills: 4 | Status: ERX

## 2025-03-10 ASSESSMENT — LOCATION SIMPLE DESCRIPTION DERM
LOCATION SIMPLE: UPPER BACK
LOCATION SIMPLE: RIGHT FOREARM
LOCATION SIMPLE: LEFT SHOULDER
LOCATION SIMPLE: RIGHT HAND
LOCATION SIMPLE: RIGHT POSTERIOR THIGH
LOCATION SIMPLE: LEFT UPPER BACK
LOCATION SIMPLE: LEFT POSTERIOR THIGH
LOCATION SIMPLE: RIGHT UPPER BACK

## 2025-03-10 ASSESSMENT — LOCATION DETAILED DESCRIPTION DERM
LOCATION DETAILED: INFERIOR THORACIC SPINE
LOCATION DETAILED: RIGHT PROXIMAL POSTERIOR THIGH
LOCATION DETAILED: LEFT DISTAL POSTERIOR THIGH
LOCATION DETAILED: RIGHT RADIAL DORSAL HAND
LOCATION DETAILED: RIGHT MID-UPPER BACK
LOCATION DETAILED: RIGHT VENTRAL PROXIMAL FOREARM
LOCATION DETAILED: LEFT SUPERIOR MEDIAL UPPER BACK
LOCATION DETAILED: LEFT POSTERIOR SHOULDER

## 2025-03-10 ASSESSMENT — LOCATION ZONE DERM
LOCATION ZONE: HAND
LOCATION ZONE: ARM
LOCATION ZONE: LEG
LOCATION ZONE: TRUNK

## 2025-03-10 NOTE — PROCEDURE: COUNSELING
Detail Level: Generalized
Statement Selected
Detail Level: Zone
Detail Level: Simple

## 2025-03-10 NOTE — PROCEDURE: TREATMENT REGIMEN
Detail Level: Simple
Continue Regimen: - betamethasone dipropionate 0.05 % topical cream Bid\\nSig: Apply to rash on arms, back, and trunk twice daily for up to 2 weeks then once daily as needed, Repeat as needed
Continue Regimen: -  Aquaphor daily \\n- Betamethasone cream bid for 2 weeks \\n- Strategies to stop picking, pt understands

## 2025-03-10 NOTE — PROCEDURE: ADDITIONAL NOTES
Additional Notes: Pt states over the last year she uses the cream when flaring at less once a week. Pt would like a refill.\\nShe has had considerable improvement since changing laundry detergent.
Detail Level: Detailed
Render Risk Assessment In Note?: no
Additional Notes: Pt states it is habit to pick.  Denies itching in areas involved.
PO intake ~50%

## 2025-04-03 DIAGNOSIS — Z82.49 FAMILY HISTORY OF HEART DISEASE: ICD-10-CM

## 2025-04-03 RX ORDER — ROSUVASTATIN CALCIUM 5 MG/1
5 TABLET, COATED ORAL EVERY EVENING
Qty: 90 TABLET | Refills: 3 | Status: SHIPPED | OUTPATIENT
Start: 2025-04-03

## 2025-04-03 NOTE — TELEPHONE ENCOUNTER
"Patient Communication Preferences indicate  Do not contact  and/or communication by \"Phone\" is not preferred. Call not required per Outreach team.    " Received request via: Pharmacy    Was the patient seen in the last year in this department? Yes    Does the patient have an active prescription (recently filled or refills available) for medication(s) requested? No    Pharmacy Name:   [facility]/pharmacy #[id number] - [address]              Does the patient have senior living Plus and need 100-day supply? (This applies to ALL medications) Patient does not have SCP

## 2025-04-11 SDOH — HEALTH STABILITY: PHYSICAL HEALTH: ON AVERAGE, HOW MANY MINUTES DO YOU ENGAGE IN EXERCISE AT THIS LEVEL?: 40 MIN

## 2025-04-11 SDOH — ECONOMIC STABILITY: INCOME INSECURITY: IN THE LAST 12 MONTHS, WAS THERE A TIME WHEN YOU WERE NOT ABLE TO PAY THE MORTGAGE OR RENT ON TIME?: NO

## 2025-04-11 SDOH — HEALTH STABILITY: PHYSICAL HEALTH: ON AVERAGE, HOW MANY DAYS PER WEEK DO YOU ENGAGE IN MODERATE TO STRENUOUS EXERCISE (LIKE A BRISK WALK)?: 5 DAYS

## 2025-04-11 SDOH — ECONOMIC STABILITY: INCOME INSECURITY: HOW HARD IS IT FOR YOU TO PAY FOR THE VERY BASICS LIKE FOOD, HOUSING, MEDICAL CARE, AND HEATING?: NOT HARD AT ALL

## 2025-04-11 SDOH — ECONOMIC STABILITY: FOOD INSECURITY: WITHIN THE PAST 12 MONTHS, THE FOOD YOU BOUGHT JUST DIDN'T LAST AND YOU DIDN'T HAVE MONEY TO GET MORE.: NEVER TRUE

## 2025-04-11 SDOH — ECONOMIC STABILITY: FOOD INSECURITY: WITHIN THE PAST 12 MONTHS, YOU WORRIED THAT YOUR FOOD WOULD RUN OUT BEFORE YOU GOT MONEY TO BUY MORE.: NEVER TRUE

## 2025-04-11 SDOH — HEALTH STABILITY: MENTAL HEALTH
STRESS IS WHEN SOMEONE FEELS TENSE, NERVOUS, ANXIOUS, OR CAN'T SLEEP AT NIGHT BECAUSE THEIR MIND IS TROUBLED. HOW STRESSED ARE YOU?: TO SOME EXTENT

## 2025-04-11 ASSESSMENT — LIFESTYLE VARIABLES
HOW OFTEN DO YOU HAVE A DRINK CONTAINING ALCOHOL: 2-3 TIMES A WEEK
HOW OFTEN DO YOU HAVE SIX OR MORE DRINKS ON ONE OCCASION: NEVER
HOW MANY STANDARD DRINKS CONTAINING ALCOHOL DO YOU HAVE ON A TYPICAL DAY: 1 OR 2
AUDIT-C TOTAL SCORE: 3
SKIP TO QUESTIONS 9-10: 1

## 2025-04-11 ASSESSMENT — SOCIAL DETERMINANTS OF HEALTH (SDOH)
IN A TYPICAL WEEK, HOW MANY TIMES DO YOU TALK ON THE PHONE WITH FAMILY, FRIENDS, OR NEIGHBORS?: MORE THAN THREE TIMES A WEEK
HOW OFTEN DO YOU ATTENT MEETINGS OF THE CLUB OR ORGANIZATION YOU BELONG TO?: MORE THAN 4 TIMES PER YEAR
HOW OFTEN DO YOU HAVE A DRINK CONTAINING ALCOHOL: 2-3 TIMES A WEEK
DO YOU BELONG TO ANY CLUBS OR ORGANIZATIONS SUCH AS CHURCH GROUPS UNIONS, FRATERNAL OR ATHLETIC GROUPS, OR SCHOOL GROUPS?: YES
IN THE PAST 12 MONTHS, HAS THE ELECTRIC, GAS, OIL, OR WATER COMPANY THREATENED TO SHUT OFF SERVICE IN YOUR HOME?: NO
HOW OFTEN DO YOU GET TOGETHER WITH FRIENDS OR RELATIVES?: MORE THAN THREE TIMES A WEEK
HOW MANY DRINKS CONTAINING ALCOHOL DO YOU HAVE ON A TYPICAL DAY WHEN YOU ARE DRINKING: 1 OR 2
DO YOU BELONG TO ANY CLUBS OR ORGANIZATIONS SUCH AS CHURCH GROUPS UNIONS, FRATERNAL OR ATHLETIC GROUPS, OR SCHOOL GROUPS?: YES
IN A TYPICAL WEEK, HOW MANY TIMES DO YOU TALK ON THE PHONE WITH FAMILY, FRIENDS, OR NEIGHBORS?: MORE THAN THREE TIMES A WEEK
WITHIN THE PAST 12 MONTHS, YOU WORRIED THAT YOUR FOOD WOULD RUN OUT BEFORE YOU GOT THE MONEY TO BUY MORE: NEVER TRUE
HOW OFTEN DO YOU ATTENT MEETINGS OF THE CLUB OR ORGANIZATION YOU BELONG TO?: MORE THAN 4 TIMES PER YEAR
HOW OFTEN DO YOU ATTEND CHURCH OR RELIGIOUS SERVICES?: NEVER
HOW OFTEN DO YOU HAVE SIX OR MORE DRINKS ON ONE OCCASION: NEVER
HOW OFTEN DO YOU GET TOGETHER WITH FRIENDS OR RELATIVES?: MORE THAN THREE TIMES A WEEK
HOW HARD IS IT FOR YOU TO PAY FOR THE VERY BASICS LIKE FOOD, HOUSING, MEDICAL CARE, AND HEATING?: NOT HARD AT ALL
HOW OFTEN DO YOU ATTEND CHURCH OR RELIGIOUS SERVICES?: NEVER

## 2025-04-14 ENCOUNTER — OFFICE VISIT (OUTPATIENT)
Dept: MEDICAL GROUP | Facility: PHYSICIAN GROUP | Age: 70
End: 2025-04-14
Payer: MEDICARE

## 2025-04-14 VITALS
HEART RATE: 83 BPM | WEIGHT: 145.6 LBS | OXYGEN SATURATION: 96 % | HEIGHT: 67 IN | RESPIRATION RATE: 14 BRPM | TEMPERATURE: 97.3 F | SYSTOLIC BLOOD PRESSURE: 122 MMHG | BODY MASS INDEX: 22.85 KG/M2 | DIASTOLIC BLOOD PRESSURE: 76 MMHG

## 2025-04-14 DIAGNOSIS — E78.2 MIXED HYPERLIPIDEMIA: ICD-10-CM

## 2025-04-14 DIAGNOSIS — Z00.00 MEDICARE ANNUAL WELLNESS VISIT, SUBSEQUENT: ICD-10-CM

## 2025-04-14 DIAGNOSIS — Z12.31 ENCOUNTER FOR SCREENING MAMMOGRAM FOR MALIGNANT NEOPLASM OF BREAST: ICD-10-CM

## 2025-04-14 DIAGNOSIS — E03.9 ACQUIRED HYPOTHYROIDISM: ICD-10-CM

## 2025-04-14 PROCEDURE — 3074F SYST BP LT 130 MM HG: CPT | Performed by: FAMILY MEDICINE

## 2025-04-14 PROCEDURE — 3078F DIAST BP <80 MM HG: CPT | Performed by: FAMILY MEDICINE

## 2025-04-14 PROCEDURE — G0439 PPPS, SUBSEQ VISIT: HCPCS | Performed by: FAMILY MEDICINE

## 2025-04-14 ASSESSMENT — ENCOUNTER SYMPTOMS: GENERAL WELL-BEING: EXCELLENT

## 2025-04-14 ASSESSMENT — ACTIVITIES OF DAILY LIVING (ADL): BATHING_REQUIRES_ASSISTANCE: 0

## 2025-04-14 ASSESSMENT — PATIENT HEALTH QUESTIONNAIRE - PHQ9: CLINICAL INTERPRETATION OF PHQ2 SCORE: 0

## 2025-04-14 NOTE — PROGRESS NOTES
Chief Complaint   Patient presents with    Annual Wellness Visit       HPI:  Cheryl Rivas is a 69 y.o. here for Medicare Annual Wellness Visit     Patient Active Problem List    Diagnosis Date Noted    Family history of heart disease 02/27/2023    Post-menopausal 02/27/2023    Total knee replacement status 08/10/2021       Current Outpatient Medications   Medication Sig Dispense Refill    rosuvastatin (CRESTOR) 5 MG Tab TAKE 1 TABLET BY MOUTH EVERY DAY IN THE EVENING 90 Tablet 3    fluticasone (FLONASE) 50 MCG/ACT nasal spray ADMINISTER 1 SPRAY INTO AFFECTED NOSTRIL(S) 2 TIMES A DAY. 48 mL 0    citalopram (CELEXA) 10 MG tablet TAKE 1 TABLET BY MOUTH EVERY DAY 90 Tablet 1    levothyroxine (SYNTHROID) 50 MCG Tab Take 1 Tablet by mouth every morning on an empty stomach. 30 Tablet 1    Omega-3 Fatty Acids (OMEGA-3 FISH OIL) 1200 MG Cap Take 2 tablet  by mouth 2 times a day. 60 Capsule 1    betamethasone dipropionate 0.05 % Cream 1 gm prn 45 g 2    estradiol (ESTRACE) 0.1 MG/GM vaginal cream Insert 2 g into the vagina.      progesterone (PROMETRIUM) 100 MG Cap Take 100 mg by mouth at bedtime.      fenofibrate micronized (LOFIBRA) 200 MG capsule Take 200 mg by mouth every day.      albuterol 108 (90 Base) MCG/ACT Aero Soln inhalation aerosol       HYDROcodone-acetaminophen (NORCO) 5-325 MG Tab per tablet TAKE 1 TABLET BY MOUTH EVERY 6 HOURS AS NEEDED FOR PAIN FOR UP TO 2 DAYS. (Patient not taking: Reported on 2/26/2024)       No current facility-administered medications for this visit.          Current supplements as per medication list.     Allergies: Oxycodone    Current social contact/activities:      She  reports that she has never smoked. She has never used smokeless tobacco. She reports current alcohol use of about 1.2 - 1.8 oz of alcohol per week. She reports that she does not use drugs.  Counseling given: Not Answered      ROS:    Gait: Uses no assistive device  Ostomy: No  Other tubes: No  Amputations:  No  Chronic oxygen use: No  Last eye exam: 2024  Wears hearing aids: Yes   : Denies any urinary leakage during the last 6 months    Screening:    Depression Screening  Little interest or pleasure in doing things?  0 - not at all  Feeling down, depressed , or hopeless? 0 - not at all  Patient Health Questionnaire Score: 0     If depressive symptoms identified deferred to follow up visit unless specifically addressed in assessment and plan.    Interpretation of PHQ-9 Total Score   Score Severity   1-4 No Depression   5-9 Mild Depression   10-14 Moderate Depression   15-19 Moderately Severe Depression   20-27 Severe Depression    Screening for Cognitive Impairment  Do you or any of your friends or family members have any concern about your memory? No  Three Minute Recall (Village, Kitchen, Baby) 2/3    [place] clock face with all 12 numbers and set the hands to show 10 minutes past 11.  Yes    Cognitive concerns identified deferred for follow up unless specifically addressed in assessment and plan.    Fall Risk Assessment  Has the patient had two or more falls in the last year or any fall with injury in the last year?  No    Safety Assessment  Do you always wear your seatbelt?  Yes  Any changes to home needed to function safely? No  Difficulty hearing.  Yes  Patient counseled about all safety risks that were identified.    Functional Assessment ADLs  Are there any barriers preventing you from cooking for yourself or meeting nutritional needs?  No.    Are there any barriers preventing you from driving safely or obtaining transportation?  No.    Are there any barriers preventing you from using a telephone or calling for help?  No    Are there any barriers preventing you from shopping?  No.    Are there any barriers preventing you from taking care of your own finances?  No    Are there any barriers preventing you from managing your medications?  No    Are there any barriers preventing you from showering, bathing or  dressing yourself? No    Are there any barriers preventing you from doing housework or laundry? No  Are there any barriers preventing you from using the toilet?No  Are you currently engaging in any exercise or physical activity?  Yes. Ride horses and walk everyday     Self-Assessment of Health  What is your perception of your health? Excellent    Do you sleep more than six hours a night? Yes    In the past 7 days, how much did pain keep you from doing your normal work? None    Do you spend quality time with family or friends (virtually or in person)? Yes    Do you usually eat a heart healthy diet that constists of a variety of fruits, vegetables, whole grains and fiber? Yes    Do you eat foods high in fat and/or Fast Food more than three times per week? No    How concerned are you that your medical conditions are not being well managed? Not at all    Are you worried that in the next 2 months, you may not have stable housing that you own, rent, or stay in as part of a household? No      Advance Care Planning  Do you have an Advance Directive, Living Will, Durable Power of , or POLST? Yes  Advance Directive Living Will     is not on file - instructed patient to bring in a copy to scan into their chart      Health Maintenance Summary            Current Care Gaps       Annual Wellness Visit (Yearly) Never done     No completion history exists for this topic.                      Needs Review       Colorectal Cancer Screening (Colonoscopy - Every 10 Years) Tentatively due on 5/13/2034 05/13/2024  AMB EXTERNAL COLONOSCOPY RESULTS    03/01/2024  OCCULT BLOOD FECES IMMUNOASSAY (FIT)                      Awaiting Completion       Mammogram (Yearly) Order placed this encounter      09/12/2023  MA-SCREENING MAMMO BILAT W/IMPLANTS W/YANCY W/CAD    11/01/2022  Done    07/27/2022  MA-SCREENING MAMMO BILAT W/IMPLANTS W/YANCY W/CAD                      Upcoming       Zoster (Shingles) Vaccines (1 of 2) Postponed until  9/14/2025     No completion history exists for this topic.              COVID-19 Vaccine (4 - 2024-25 season) Postponed until 4/14/2026 12/16/2021  Imm Admin: PFIZER PURPLE CAP SARS-COV-2 VACCINATION (12+)    03/29/2021  Imm Admin: PFIZER PURPLE CAP SARS-COV-2 VACCINATION (12+)    03/08/2021  Imm Admin: PFIZER PURPLE CAP SARS-COV-2 VACCINATION (12+)              Pneumococcal Vaccine: 50+ Years (1 of 1 - PCV) Postponed until 4/14/2026     No completion history exists for this topic.              Bone Density Scan (Every 5 Years) Next due on 3/7/2028      03/07/2023  DS-BONE DENSITY STUDY (DEXA)              IMM DTaP/Tdap/Td Vaccine (2 - Td or Tdap) Next due on 12/19/2032 12/19/2022  Imm Admin: Tdap Vaccine                      Completed or No Longer Recommended       Influenza Vaccine (Series Information) Completed      10/28/2024  Outside Immunization: Influenza, High Dose    10/14/2023  Imm Admin: Influenza Vaccine, Quadrivalent, Adjuvanted (Pf)    02/27/2023  Imm Admin: Influenza Vaccine Adult HD              Hepatitis A Vaccine (Hep A) (Series Information) Aged Out     No completion history exists for this topic.              Hepatitis B Vaccine (Hep B) (Series Information) Aged Out     No completion history exists for this topic.              HPV Vaccines (Series Information) Aged Out     No completion history exists for this topic.              Polio Vaccine (Inactivated Polio) (Series Information) Aged Out     No completion history exists for this topic.              Meningococcal Immunization (Series Information) Aged Out     No completion history exists for this topic.              Hepatitis C Screening  Discontinued     No completion history exists for this topic.                            Patient Care Team:  Vikram Phelan M.D. as PCP - General (Family Medicine)        Social History     Tobacco Use    Smoking status: Never    Smokeless tobacco: Never   Vaping Use    Vaping status: Never Used  "  Substance Use Topics    Alcohol use: Yes     Alcohol/week: 1.2 - 1.8 oz     Types: 2 - 3 Standard drinks or equivalent per week    Drug use: Never     Family History   Problem Relation Age of Onset    Alcohol abuse Mother     Heart Disease Father          of massive heart attack age 40    Heart Disease Brother         Younger brother.  of congestive heart failure    Hypertension Brother     Hyperlipidemia Brother     Drug abuse Brother     Hyperlipidemia Paternal Aunt     Hyperlipidemia Paternal Uncle      She  has a past medical history of Asthma and Thyroid disease.   Past Surgical History:   Procedure Laterality Date    PB TOTAL KNEE ARTHROPLASTY Right 10/18/2021    Procedure: RIGHT TOTAL KNEE ARTHROPLASTY;  Surgeon: Joel Salinas M.D.;  Location: [facility];  Service: Orthopedics    KNEE ARTHROSCOPY Right 2020    Procedure: RT KNEE ARTHROSCOPY POSSIBLE ARTHROTOMY PARTIAL MEDIAL MENISCECTMY AND CHONDROPLASTY;  Surgeon: Marques Bhakta M.D.;  Location: SURGERY [facility];  Service: Orthopedics    APPENDECTOMY      ARTHROPLASTY      LUMPECTOMY      OTHER      face lift    OTHER ABDOMINAL SURGERY      appendectomy       Exam:   Pulse 83   Temp 36.3 °C (97.3 °F) (Temporal)   Resp 14   Ht 1.689 m (5' 6.5\")   Wt 66 kg (145 lb 9.6 oz)   SpO2 96%  Body mass index is 23.15 kg/m².    Hearing good.    Dentition good  Alert, oriented in no acute distress.  Eye contact is good, speech goal directed, affect calm    Assessment and Plan. The following treatment and monitoring plan is recommended:    1. Medicare annual wellness visit, subsequent  Utd on     2. Encounter for screening mammogram for malignant neoplasm of breast    - MA-SCREENING MAMMO BILAT W/CAD; Future    3. Mixed hyperlipidemia  Currently treated for HLD, taking meds with no new myalgias or joint pain, watching fats in diet  controlled      4. Acquired hypothyroidism  Patient is being treated for " hypothyroidism, currently taking meds, no symptoms of fast or slow heartbeat and energy level steady. No new hair loss or skin symptoms. Labs have been checked in past year and are stable on current dose.  controlled      Past Medical History:   Diagnosis Date    Asthma     Thyroid disease      Past Surgical History:   Procedure Laterality Date    PB TOTAL KNEE ARTHROPLASTY Right 10/18/2021    Procedure: RIGHT TOTAL KNEE ARTHROPLASTY;  Surgeon: Joel Salinas M.D.;  Location: [facility];  Service: Orthopedics    KNEE ARTHROSCOPY Right 2020    Procedure: RT KNEE ARTHROSCOPY POSSIBLE ARTHROTOMY PARTIAL MEDIAL MENISCECTMY AND CHONDROPLASTY;  Surgeon: Marques Bhakta M.D.;  Location: SURGERY [facility];  Service: Orthopedics    APPENDECTOMY      ARTHROPLASTY      LUMPECTOMY      OTHER      face lift    OTHER ABDOMINAL SURGERY      appendectomy       Social History     Tobacco Use    Smoking status: Never    Smokeless tobacco: Never   Vaping Use    Vaping status: Never Used   Substance Use Topics    Alcohol use: Yes     Alcohol/week: 1.2 - 1.8 oz     Types: 2 - 3 Standard drinks or equivalent per week    Drug use: Never       Family History   Problem Relation Age of Onset    Alcohol abuse Mother     Heart Disease Father          of massive heart attack age 40    Heart Disease Brother         Younger brother.  of congestive heart failure    Hypertension Brother     Hyperlipidemia Brother     Drug abuse Brother     Hyperlipidemia Paternal Aunt     Hyperlipidemia Paternal Uncle          Current Outpatient Medications:     rosuvastatin (CRESTOR) 5 MG Tab, TAKE 1 TABLET BY MOUTH EVERY DAY IN THE EVENING, Disp: 90 Tablet, Rfl: 3    fluticasone (FLONASE) 50 MCG/ACT nasal spray, ADMINISTER 1 SPRAY INTO AFFECTED NOSTRIL(S) 2 TIMES A DAY., Disp: 48 mL, Rfl: 0    citalopram (CELEXA) 10 MG tablet, TAKE 1 TABLET BY MOUTH EVERY DAY, Disp: 90 Tablet, Rfl: 1    levothyroxine (SYNTHROID) 50  MCG Tab, Take 1 Tablet by mouth every morning on an empty stomach., Disp: 30 Tablet, Rfl: 1    Omega-3 Fatty Acids (OMEGA-3 FISH OIL) 1200 MG Cap, Take 2 tablet  by mouth 2 times a day., Disp: 60 Capsule, Rfl: 1    betamethasone dipropionate 0.05 % Cream, 1 gm prn, Disp: 45 g, Rfl: 2    estradiol (ESTRACE) 0.1 MG/GM vaginal cream, Insert 2 g into the vagina., Disp: , Rfl:     progesterone (PROMETRIUM) 100 MG Cap, Take 100 mg by mouth at bedtime., Disp: , Rfl:     fenofibrate micronized (LOFIBRA) 200 MG capsule, Take 200 mg by mouth every day., Disp: , Rfl:     albuterol 108 (90 Base) MCG/ACT Aero Soln inhalation aerosol, , Disp: , Rfl:     HYDROcodone-acetaminophen (NORCO) 5-325 MG Tab per tablet, TAKE 1 TABLET BY MOUTH EVERY 6 HOURS AS NEEDED FOR PAIN FOR UP TO 2 DAYS. (Patient not taking: Reported on 2/26/2024), Disp: , Rfl:     Patient was instructed on the use of medications, either prescriptions or OTC and informed on when the appropriate follow up time period should be. In addition, patient was also instructed that should any acute worsening occur that they should notify this clinic asap or call 911.        Services suggested: No services needed at this time  Health Care Screening: Age-appropriate preventive services recommended by USPTF and ACIP covered by Medicare were discussed today. Services ordered if indicated and agreed upon by the patient.  Referrals offered: Community-based lifestyle interventions to reduce health risks and promote self-management and wellness, fall prevention, nutrition, physical activity, tobacco-use cessation, weight loss, and mental health services as per orders if indicated.    Discussion today about general wellness and lifestyle habits:    Prevent falls and reduce trip hazards; Cautioned about securing or removing rugs.  Have a working fire alarm and carbon monoxide detector;   Engage in regular physical activity and social activities     Follow-up: No follow-ups on file.

## 2025-05-07 ENCOUNTER — PHARMACY VISIT (OUTPATIENT)
Dept: PHARMACY | Facility: MEDICAL CENTER | Age: 70
End: 2025-05-07
Payer: COMMERCIAL

## 2025-05-07 ENCOUNTER — OFFICE VISIT (OUTPATIENT)
Dept: URGENT CARE | Facility: CLINIC | Age: 70
End: 2025-05-07
Payer: MEDICARE

## 2025-05-07 VITALS
SYSTOLIC BLOOD PRESSURE: 132 MMHG | WEIGHT: 140 LBS | DIASTOLIC BLOOD PRESSURE: 88 MMHG | HEIGHT: 67 IN | BODY MASS INDEX: 21.97 KG/M2 | RESPIRATION RATE: 16 BRPM | OXYGEN SATURATION: 98 % | HEART RATE: 91 BPM | TEMPERATURE: 99.2 F
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
## 2025-05-07 PROCEDURE — RXOTC WILLOW AMBULATORY OTC CHARGE

## 2025-05-07 PROCEDURE — 99213 OFFICE O/P EST LOW 20 MIN: CPT

## 2025-05-07 PROCEDURE — RXMED WILLOW AMBULATORY MEDICATION CHARGE

## 2025-05-08 NOTE — PROGRESS NOTES
"Subjective     Ro Juanita Rivas is a 69 y.o. female who presents with Animal Bite (Dog bite on R hand pointer finger )            Sabina is here today after her Ismael Ebnji terrier accidentally bit her pointer finger when she was trying to get some raw chicken out of his mouth. Her dog is up to date on all his vaccinations.    Animal Bite  This is a new problem. The current episode started today. Pertinent negatives include no chest pain, congestion, coughing, fever, nausea, rash, vomiting or weakness.       Review of Systems   Constitutional:  Negative for fever and malaise/fatigue.   HENT:  Negative for congestion.    Respiratory:  Negative for cough and shortness of breath.    Cardiovascular:  Negative for chest pain.   Gastrointestinal:  Negative for diarrhea, nausea and vomiting.   Skin:  Negative for rash.   Neurological:  Negative for dizziness and weakness.   All other systems reviewed and are negative.             Objective     /88   Pulse 91   Temp 37.3 °C (99.2 °F) (Temporal)   Resp 16   Ht 1.689 m (5' 6.5\")   Wt 63.5 kg (140 lb)   SpO2 98%   BMI 22.26 kg/m²      Physical Exam  Vitals reviewed.   Constitutional:       Appearance: Normal appearance.   HENT:      Head: Normocephalic.      Nose: Nose normal.   Eyes:      Extraocular Movements: Extraocular movements intact.      Conjunctiva/sclera: Conjunctivae normal.   Cardiovascular:      Rate and Rhythm: Normal rate.   Pulmonary:      Effort: Pulmonary effort is normal.   Musculoskeletal:         General: Normal range of motion.        Hands:       Comments: 2 small puncture wounds surrounded by erythema and edema   Skin:     General: Skin is warm and dry.   Neurological:      Mental Status: She is alert and oriented to person, place, and time.   Psychiatric:         Behavior: Behavior normal.                                  Assessment & Plan  Dog bite, initial encounter    Orders:    amoxicillin-clavulanate (AUGMENTIN) 875-125 MG Tab; Take 1 " Tablet by mouth 2 times a day for 7 days.     Educated Ro to take entire course of antibiotics even if symptoms improve or she begins to feel better.    Encouraged warm water and Epsom salt soak twice a day for 10 to 20 minutes.    Shared decision-making was utilized with Ro for plan of care. Discussed differential diagnoses, natural history, and supportive care. Reviewed indication for use and the potential benefits and side effects of medications. Ro was encouraged to monitor symptoms closely and educated about signs and symptoms that would warrant concern and mandate seeking a higher level of service through the emergency department discussed at length. All questions answered to Ro's satisfaction and they were in agreement with treatment plan at time of discharge.

## 2025-05-11 ASSESSMENT — ENCOUNTER SYMPTOMS
FEVER: 0
WEAKNESS: 0
COUGH: 0
NAUSEA: 0
SHORTNESS OF BREATH: 0
VOMITING: 0
DIZZINESS: 0
DIARRHEA: 0

## 2025-06-03 DIAGNOSIS — Z78.0 POST-MENOPAUSAL: ICD-10-CM

## 2025-06-04 RX ORDER — FLUTICASONE PROPIONATE 50 MCG
1 SPRAY, SUSPENSION (ML) NASAL 2 TIMES DAILY
Qty: 48 ML | Refills: 0 | Status: SHIPPED | OUTPATIENT
Start: 2025-06-04

## 2025-06-16 NOTE — HPI: FULL BODY SKIN EXAMINATION
How Severe Are Your Spot(S)?: moderate
What Type Of Note Output Would You Prefer (Optional)?: Standard Output
What Is The Reason For Today's Visit?: Skin Lesions
What Is The Reason For Today's Visit? (Being Monitored For X): the development of new lesions
12

## 2025-06-17 DIAGNOSIS — Z78.0 POST-MENOPAUSAL: Primary | ICD-10-CM

## 2025-06-17 RX ORDER — CITALOPRAM HYDROBROMIDE 10 MG/1
10 TABLET ORAL DAILY
Qty: 90 TABLET | Refills: 1 | Status: SHIPPED | OUTPATIENT
Start: 2025-06-17

## 2025-06-17 NOTE — TELEPHONE ENCOUNTER
Received request via: Pharmacy    Was the patient seen in the last year in this department? Yes    Does the patient have an active prescription (recently filled or refills available) for medication(s) requested? No    Pharmacy Name: [facility]/pharmacy #[id number] - [address]     Does the patient have assisted Plus and need 100-day supply? (This applies to ALL medications) Patient does not have SCP